# Patient Record
Sex: FEMALE | Race: WHITE | NOT HISPANIC OR LATINO | Employment: OTHER | URBAN - METROPOLITAN AREA
[De-identification: names, ages, dates, MRNs, and addresses within clinical notes are randomized per-mention and may not be internally consistent; named-entity substitution may affect disease eponyms.]

---

## 2017-12-27 ENCOUNTER — APPOINTMENT (OUTPATIENT)
Dept: WOMENS IMAGING | Facility: HOSPITAL | Age: 62
End: 2017-12-27

## 2017-12-27 PROCEDURE — 77067 SCR MAMMO BI INCL CAD: CPT | Performed by: RADIOLOGY

## 2019-06-26 ENCOUNTER — APPOINTMENT (OUTPATIENT)
Dept: WOMENS IMAGING | Facility: HOSPITAL | Age: 64
End: 2019-06-26

## 2019-06-26 PROCEDURE — 77067 SCR MAMMO BI INCL CAD: CPT | Performed by: RADIOLOGY

## 2019-06-26 PROCEDURE — 77063 BREAST TOMOSYNTHESIS BI: CPT | Performed by: RADIOLOGY

## 2021-04-26 ENCOUNTER — OFFICE VISIT CONVERTED (OUTPATIENT)
Dept: SURGERY | Facility: CLINIC | Age: 66
End: 2021-04-26
Attending: NURSE PRACTITIONER

## 2021-05-11 NOTE — H&P
History and Physical      Patient Name: Bailey Reese   Patient ID: [id number]   Sex: Female   YOB: 1955    Primary Care Provider: Truong Garcia MD   Referring Provider: Danielle Garcia MD    Visit Date: April 26, 2021    Provider: DAIANA Guerrero   Location: [facility]   Location Address: [address] [place] [address]  [id number]   Location Phone: [phone]          Chief Complaint  · Requesting colonoscopy  · Age 50 or over  · Blood in Stool     positive cologuard       History Of Present Illness  The patient is a 65 year old /White female presenting to the Surgical Specialist office on a referral from Danielle Garcia MD.   Bailey Reese needs to have a diagnostic colonoscopy.   Patient states that they have not had a colonoscopy.   Patient currently complains of: abnormal stool study   Patient Does not have family history of colon cancer.      Patient presents on referral from Dr. Danielle Garcia for a positive Cologuard.  Patient admits to having some blood in her stool occasionally.  She denies any lower abdominal pain change in bowel habit or rectal bleeding.  She denies any family history of colorectal cancer.  No previous colonoscopy.       Past Medical History  Disease Name Date Onset Notes   Allergic rhinitis, chronic --  --    Arthritis --  --    GERD --  --    High blood pressure --  --    Limb Pain --  --    Limb Swelling --  --          Past Surgical History  Procedure Name Date Notes   Gallbladder --  --    Knee Replacement --  --    Lap Band Surgery --  --          Medication List  Name Date Started Instructions   Claritin 10 mg oral tablet  take 1 tablet (10 mg) by oral route once daily   hydrocodone-acetaminophen 7.5-325 mg oral tablet  take 1 tablet by oral route every 4 hours as needed for pain   Hyzaar 100-25 mg oral tablet  take 1 tablet by oral route once daily   promethazine 12.5 mg oral tablet 08/07/2017 1 po [id number] [id number]  "        Allergy List  Allergen Name Date Reaction Notes   NO KNOWN DRUG ALLERGIES --  --  --    Tape Adherent --  --  --        Allergies Reconciled  Family Medical History  Disease Name Relative/Age Notes   Diabetes, unspecified type Mother/   Mother  Mother; Grandmother (maternal)   -Father's Family History Unknown Father/   Father         Social History  Finding Status Start/Stop Quantity Notes   Alcohol Never --/-- --  drinks no   Caffeine Current - status unknown --/-- --  drinks tea and soft drinks; 1-2 times per day   Second hand smoke exposure Never --/-- --  no   Tobacco Never --/-- --  never a smoker         Review of Systems  · Constitutional  o Denies  o : fever, chills  · Eyes  o Denies  o : yellowish discoloration of eyes  · HENT  o Denies  o : difficulty swallowing  · Cardiovascular  o Denies  o : chest pain, chest pain on exertion  · Respiratory  o Denies  o : shortness of breath  · Gastrointestinal  o Denies  o : nausea, vomiting, diarrhea, constipation  · Genitourinary  o Denies  o : abnormal color of urine  · Integument  o Denies  o : rash  · Neurologic  o Denies  o : tingling or numbness  · Musculoskeletal  o Denies  o : joint pain  · Endocrine  o Denies  o : weight gain, weight loss      Vitals  Date Time BP Position Site L\R Cuff Size HR RR TEMP (F) WT  HT  BMI kg/m2 BSA m2 O2 Sat FR L/min FiO2 HC       04/26/2021 01:32 PM       14  305lbs 8oz 5'  3\" 54.12 2.48             Physical Examination  · Constitutional  o Appearance  o : well developed, well-nourished, patient in no apparent distress  · Head and Face  o Head  o :   § Inspection  § : atraumatic, normocephalic  o Face  o :   § Inspection  § : no facial lesions  · Eyes  o Conjunctivae  o : conjunctivae normal  o Sclerae  o : sclerae white  · Neck  o Inspection/Palpation  o : normal appearance, no masses or tenderness, trachea midline  · Respiratory  o Respiratory Effort  o : breathing unlabored  · Skin and Subcutaneous Tissue  o General " Inspection  o : no lesions present, no areas of discoloration, skin turgor normal, texture normal  · Neurologic  o Mental Status Examination  o :   § Orientation  § : grossly oriented to person, place and time  § Attention  § : attention normal, concentration abilities normal  § Fund of Knowledge  § : fund of knowledge within normal limits, patient aware of current events  o Gait and Station  o : normal gait, able to stand without difficulty  · Psychiatric  o Judgement and Insight  o : judgment and insight intact  o Mood and Affect  o : mood normal, affect appropriate              Assessment  · Abnormal stool test     792.1/R19.5    Problems Reconciled  Plan  · Orders  o Consent for Colonoscopy with Possible Biopsy - Possible risks/complications, benefits, and alternatives to surgical or invasive procedure have been explained to patient and/or legal guardian. -Patient has been evaluated and can tolerate anesthesia and/or sedation. Risks, benefits, and alternatives to anesthesia and sedation have been explained to patient and/or legal guardian. (51985) - 792.1/R19.5 - 05/13/2021  · Medications  o Medications have been Reconciled  o Transition of Care or Provider Policy  · Instructions  o Surgical Facility: [facility]  o Handouts Provided Pre-Procedure Instructions including date, time, and location of procedure.   o PLAN: Proceeed with colonoscopy. Patient understands risks/benefits and is willing to proceed.   o ***Surgical Orders***  o RISK AND BENEFITS:  o Given these options, the patient has verbally expressed an understanding of the risks of the surgery and finds these risks acceptable. Will proceed with surgery as soon as possible.  o O.R. PREP: Per protocol   o IV: Per Anesthesia  o Please sign permit for: Colonoscopy with possible biopsies by Dr. Smith.   o The above History and Physical Examination has been completed within 30 days of admission.  o ***Patient  Status***  o Outpatient  o Follow up in the in the office post procedure.  o Electronically Identified Patient Education Materials Provided Electronically  · Disposition  o EMR dragon/transcription disclaimer: Much of this encounter note is an electronic transcription/translation of spoken language to printed text. Electronic translation of spoken language may permit erroneous, or at times nonsensical words or phrases to be inadvertently trasncribed; although I have reviewed the note for such errors, some may still exist.            Electronically Signed by: DAIANA Guerrero -Author on April 26, 2021 01:56:24 PM

## 2021-05-13 ENCOUNTER — HOSPITAL ENCOUNTER (OUTPATIENT)
Dept: GASTROENTEROLOGY | Facility: HOSPITAL | Age: 66
Setting detail: HOSPITAL OUTPATIENT SURGERY
Discharge: HOME | End: 2021-05-13
Attending: SURGERY

## 2021-05-14 VITALS — HEIGHT: 63 IN | WEIGHT: 293 LBS | BODY MASS INDEX: 51.91 KG/M2 | RESPIRATION RATE: 14 BRPM

## 2021-05-26 ENCOUNTER — TRANSCRIBE ORDERS (OUTPATIENT)
Dept: ADMINISTRATIVE | Facility: HOSPITAL | Age: 66
End: 2021-05-26

## 2021-05-26 ENCOUNTER — OFFICE VISIT CONVERTED (OUTPATIENT)
Dept: SURGERY | Facility: CLINIC | Age: 66
End: 2021-05-26
Attending: SURGERY

## 2021-05-26 DIAGNOSIS — K63.89 COLONIC MASS: Primary | ICD-10-CM

## 2021-06-05 NOTE — H&P
History and Physical      Patient Name: Bailey Reese   Patient ID: [id number]   Sex: Female   YOB: 1955    Primary Care Provider: Truong Garcia MD    Visit Date: May 26, 2021    Provider: Nura Smith MD   Location: [facility]   Location Address: [address] [place] [address]  [id number]   Location Phone: [phone]          Chief Complaint  · Outpatient History & Physical / Surgical Orders  · Benign cecal mass      History Of Present Illness  Bailey Reese is a 65 year old /White female who follows-up status post colonoscopy, which was for screening. On colonoscopy, she was found to have internal hemorrhoids, a polyp in the rectum, some erythema in the rectosigmoid junction, a mass in her cecum, and diverticulosis of the sigmoid colon. On pathology, she was found to have a cecal mass, which came back as normal mucosa with underlying lymphoid aggregate. This was also sent to  for reassessment and they agree with the findings of a normal colonic mucosa. She had a rectosigmoid lesion, which just showed some acute inflammation with no signs of malignancy. She had a rectal polyp, which came back as a submucosal leiomyoma.       Past Medical History  Allergic rhinitis, chronic; Arthritis; GERD; High blood pressure; Limb Pain; Limb Swelling         Past Surgical History  Colonoscopy; Gallbladder; Knee Replacement; Lap Band Surgery         Medication List  Hyzaar 100-25 mg oral tablet; ibuprofen 400 mg oral tablet; multivitamin oral tablet; Osteo Bi-Flex 250-200 mg oral tablet; Prozac 10 mg oral capsule; Suprep Bowel Prep Kit 17.5-3.13-1.6 gram oral recon soln; Tylenol 325 mg oral tablet         Allergy List  NO KNOWN DRUG ALLERGIES; Tape Adherent       Allergies Reconciled  Family Medical History  Diabetes, unspecified type; -Father's Family History Unknown         Social History  Alcohol (Never); Caffeine (Current - status unknown); Second hand smoke  "exposure (Never); Tobacco (Never)         Review of Systems  · Gastrointestinal  o Denies  o : nausea, vomiting, diarrhea, constipation      Vitals  Date Time BP Position Site L\R Cuff Size HR RR TEMP (F) WT  HT  BMI kg/m2 BSA m2 O2 Sat FR L/min FiO2 HC       05/26/2021 10:51 AM         306lbs 2oz 5'  3\" 54.23 2.48             Physical Examination  · Constitutional  o Appearance  o : well developed, well-nourished, alert and in no acute distress  · Head and Face  o Head  o :   § Inspection  § : no deformities or lesions  · Eyes  o Conjunctivae  o : clear  o Sclerae  o : clear  · Neck  o Inspection/Palpation  o : normal appearance, no masses or tenderness, trachea midline  · Respiratory  o Respiratory Effort  o : breathing unlabored  o Inspection of Chest  o : normal appearance, no retractions  · Cardiovascular  o Heart  o : regular rate and rhythm  · Lymphatic  o Neck  o : no lymphadenopathy present  o Axilla  o : no lymphadenopathy present  o Groin  o : no lymphadenopathy present  · Skin and Subcutaneous Tissue  o General Inspection  o : no rashes present, no lesions present, no areas of discoloration  · Neurologic  o Cranial Nerves  o : grossly intact  o Sensation  o : grossly intact  o Gait and Station  o :   § Gait Screening  § : normal gait, able to stand without diffculty  o Cerebellar Function  o : no obvious abnormalities  · Psychiatric  o Judgement and Insight  o : judgment and insight intact  o Mood and Affect  o : mood normal, affect appropriate          Assessment  · Pre-Surgical Orders     V72.84  · Colonic Polyps, Personal History of     V12.72  · Colonic mass     569.89/K63.89  Benign cecal mass      Plan  · Orders  o Colonoscopy (05880) - 569.89/K63.89, V72.84, V12.72 - 08/20/2021  o CT Abdomen and Pelvis with IV Contrast [facility]; suggest Oral Prep (83613) - 569.89/K63.89 - 06/09/2021   [facility] on 6/09/21 at 1130AM  o CT Abdomen and Pelvis without IV Contrast [facility]; suggest oral prep " (allergic--drink Readicat; not allegic but with renal failure--drink Omnipaque) (28756) - 569.89/K63.89 - 06/09/2021  · Medications  o Medications have been Reconciled  o Transition of Care or Provider Policy  · Instructions  o PLAN:   o Handouts Provided-Pre-Procedure Instructions including date and time and location of procedure.  o Surgical Facility: [facility]  o ****Patient Status****  o Outpatient  o ********************  o RISK AND BENEFITS:  o Consent for surgery: Given these options, the patient has verbally expressed an understanding of the risks of surgery and finds these risks acceptable. We will proceed with surgery as soon as possible.  o Consult Anesthesia for any post-operative block, or any pain management procedure deemed necessary by the anestesiologist for adequate post-operative pain control.   o O.R. PREP: Per protocol  o IV: Per Anesthesia  o PLEASE SIGN PERMIT FOR: Colonoscopy with possible biopsies   o *___The above History and Physical Examination has been completed within 30 days of admission.  o Electronically Identified Patient Education Materials Provided Electronically     In regards to the mass, on appearance, the mass did not appear to be malignant but it was a reasonably sizeable mass in the cecum. I thought there was the potential that it could be a lipoma but did take several biopsies of this area.     Seeing as two pathologist agreed this was normal colonic mucosa, I have less concern that it is a malignant mass but still want to verify that we are not missing something.     I have discussed with the patient I would like to go ahead and get some imaging of this area. We will send her for a CT scan of the abdomen and pelvis with PO and IV contrast for reassessment. I would also like to go ahead and repeat the colonoscopy in a few months, just to re-evaluate the area. We will likely take more biopsies just to verify this isn't something more insidious.     Otherwise, I  discussed with the patient her other findings. The leiomyoma is essentially a benign finding. Her hemorrhoids and diverticulitis were discussed. I gave her some printed material on diverticulosis. We discussed things to watch out for with diverticulitis. We discussed diet and lifestyle changes for both hemorrhoids and diverticulosis.     I will follow-up with the patient after her imaging and we will plan for a colonoscopy in the short term. I discussed all of this with the patient. All questions were answered.  She voiced understanding and agreed to proceed with the above plan.             Electronically Signed by: Kacy Borjas-, -Author on June 1, 2021 03:17:22 PM  Electronically Co-signed by: Nura Smith MD -Reviewer on June 1, 2021 09:49:31 PM

## 2021-06-09 ENCOUNTER — APPOINTMENT (OUTPATIENT)
Dept: CT IMAGING | Facility: HOSPITAL | Age: 66
End: 2021-06-09

## 2021-06-14 PROBLEM — J30.9 CHRONIC ALLERGIC RHINITIS: Status: ACTIVE | Noted: 2021-06-14

## 2021-06-14 PROBLEM — I10 HIGH BLOOD PRESSURE: Status: ACTIVE | Noted: 2021-06-14

## 2021-06-14 PROBLEM — M79.89 LIMB SWELLING: Status: ACTIVE | Noted: 2021-06-14

## 2021-06-14 PROBLEM — M79.609 LIMB PAIN: Status: ACTIVE | Noted: 2021-06-14

## 2021-06-14 PROBLEM — K21.9 GERD (GASTROESOPHAGEAL REFLUX DISEASE): Status: ACTIVE | Noted: 2021-06-14

## 2021-06-14 PROBLEM — M19.90 ARTHRITIS: Status: ACTIVE | Noted: 2021-06-14

## 2021-06-15 RX ORDER — LORATADINE 10 MG/1
TABLET ORAL
COMMUNITY
End: 2022-11-28

## 2021-06-15 RX ORDER — HYDROCODONE BITARTRATE AND ACETAMINOPHEN 7.5; 325 MG/1; MG/1
TABLET ORAL
COMMUNITY
End: 2021-09-02

## 2021-06-15 RX ORDER — LOSARTAN POTASSIUM AND HYDROCHLOROTHIAZIDE 25; 100 MG/1; MG/1
TABLET ORAL
COMMUNITY

## 2021-06-15 RX ORDER — FLUOXETINE 10 MG/1
10 CAPSULE ORAL DAILY
COMMUNITY
Start: 2021-03-10 | End: 2021-09-02

## 2021-06-15 RX ORDER — IBUPROFEN 400 MG/1
TABLET ORAL
COMMUNITY
End: 2022-11-28

## 2021-06-15 RX ORDER — LOSARTAN POTASSIUM AND HYDROCHLOROTHIAZIDE 25; 100 MG/1; MG/1
1 TABLET ORAL DAILY
COMMUNITY
Start: 2021-03-10 | End: 2021-09-02

## 2021-06-15 RX ORDER — FLUOXETINE 10 MG/1
CAPSULE ORAL
COMMUNITY

## 2021-06-15 RX ORDER — ACETAMINOPHEN 325 MG/1
TABLET ORAL
COMMUNITY

## 2021-06-15 RX ORDER — MULTIPLE VITAMINS W/ MINERALS TAB 9MG-400MCG
TAB ORAL
COMMUNITY

## 2021-06-17 ENCOUNTER — HOSPITAL ENCOUNTER (OUTPATIENT)
Dept: CT IMAGING | Facility: HOSPITAL | Age: 66
Discharge: HOME | End: 2021-06-17
Admitting: SURGERY

## 2021-06-17 DIAGNOSIS — K63.89 COLONIC MASS: ICD-10-CM

## 2021-06-17 LAB — CREAT BLDA-MCNC: 0.4 MG/DL

## 2021-06-17 PROCEDURE — 74178 CT ABD&PLV WO CNTR FLWD CNTR: CPT

## 2021-06-17 PROCEDURE — 82565 ASSAY OF CREATININE: CPT

## 2021-06-17 PROCEDURE — 0 IOPAMIDOL PER 1 ML: Performed by: SURGERY

## 2021-06-17 RX ADMIN — IOPAMIDOL 100 ML: 755 INJECTION, SOLUTION INTRAVENOUS at 15:10

## 2021-06-18 RX ORDER — CETIRIZINE HYDROCHLORIDE 10 MG/1
10 TABLET ORAL AS NEEDED
COMMUNITY
Start: 2021-05-30

## 2021-06-18 RX ORDER — CITALOPRAM 20 MG/1
20 TABLET ORAL DAILY
COMMUNITY
End: 2022-11-28

## 2021-06-18 RX ORDER — SODIUM, POTASSIUM,MAG SULFATES 17.5-3.13G
SOLUTION, RECONSTITUTED, ORAL ORAL
COMMUNITY
Start: 2021-05-26 | End: 2021-09-02

## 2021-06-21 ENCOUNTER — APPOINTMENT (OUTPATIENT)
Dept: WOMENS IMAGING | Facility: HOSPITAL | Age: 66
End: 2021-06-21

## 2021-06-21 PROCEDURE — 77067 SCR MAMMO BI INCL CAD: CPT | Performed by: RADIOLOGY

## 2021-06-21 PROCEDURE — 77063 BREAST TOMOSYNTHESIS BI: CPT | Performed by: RADIOLOGY

## 2021-06-22 ENCOUNTER — OFFICE VISIT (OUTPATIENT)
Dept: SURGERY | Facility: CLINIC | Age: 66
End: 2021-06-22

## 2021-06-22 ENCOUNTER — PREP FOR SURGERY (OUTPATIENT)
Dept: OTHER | Facility: HOSPITAL | Age: 66
End: 2021-06-22

## 2021-06-22 VITALS — HEIGHT: 63 IN | RESPIRATION RATE: 14 BRPM | BODY MASS INDEX: 51.91 KG/M2 | WEIGHT: 293 LBS

## 2021-06-22 DIAGNOSIS — K63.89 COLONIC MASS: Primary | ICD-10-CM

## 2021-06-22 DIAGNOSIS — K44.9 HIATAL HERNIA: Primary | ICD-10-CM

## 2021-06-22 PROCEDURE — 99213 OFFICE O/P EST LOW 20 MIN: CPT | Performed by: SURGERY

## 2021-06-23 NOTE — PROGRESS NOTES
Chief Complaint: Follow-up (CT)    Subjective         History of Present Illness  Bailey Reese is a 65 y.o. female presents to [facility] to be seen for follow-up after CT scan for colon mass.    Objective     Past Medical History:   Diagnosis Date   • Allergic rhinitis    • Arthritis    • GERD (gastroesophageal reflux disease)    • High blood pressure    • Limb pain    • Limb swelling        Past Surgical History:   Procedure Laterality Date   • COLONOSCOPY     • GALLBLADDER SURGERY     • LAPAROSCOPIC GASTRIC BANDING     • REPLACEMENT TOTAL KNEE           Current Outpatient Medications:   •  acetaminophen (Tylenol) 325 MG tablet, Tylenol 325 mg oral tablet take 1 tablet (325 mg) by oral route every 4 hours as needed   Active, Disp: , Rfl:   •  cetirizine (zyrTEC) 10 MG tablet, Take 10 mg by mouth Daily., Disp: , Rfl:   •  citalopram (CeleXA) 20 MG tablet, Take 20 mg by mouth Daily., Disp: , Rfl:   •  FLUoxetine (PROzac) 10 MG capsule, Prozac 10 mg oral capsule take 1 capsule (10 mg) by oral route once daily   Active, Disp: , Rfl:   •  FLUoxetine (PROzac) 10 MG capsule, Take 10 mg by mouth Daily., Disp: , Rfl:   •  Glucosamine-Chondroitin 250-200 MG tablet, Osteo Bi-Flex 250-200 mg oral tablet take 1 tablet by oral route daily   Active, Disp: , Rfl:   •  HYDROcodone-acetaminophen (Norco) 7.5-325 MG per tablet, Norco Oral Tablet 7.5-325 mg take 1-2 tablets by oral route every 4 to 6 hours   Suspended, Disp: , Rfl:   •  ibuprofen (ADVIL,MOTRIN) 400 MG tablet, , Disp: , Rfl:   •  loratadine (Claritin) 10 MG tablet, Claritin 10 mg oral tablet take 1 tablet (10 mg) by oral route once daily   Suspended, Disp: , Rfl:   •  losartan-hydrochlorothiazide (Hyzaar) 100-25 MG per tablet, Hyzaar 100-25 mg oral tablet take 1 tablet by oral route once daily   Active, Disp: , Rfl:   •  losartan-hydrochlorothiazide (HYZAAR) 100-25 MG per tablet, Take 1 tablet by mouth Daily., Disp: , Rfl:   •   multivitamin with minerals (MULTIVITAMIN ADULT PO), multivitamin oral tablet take 1 tablet by oral route daily   Active, Disp: , Rfl:   •  Suprep Bowel Prep Kit 17.5-3.13-1.6 GM/177ML solution oral solution, take as directed for 1 day, Disp: , Rfl:     Allergies   Allergen Reactions   • Adhesive Tape Palpitations        Family History   Problem Relation Age of Onset   • Diabetes Mother    • Diabetes Maternal Grandmother        Social History     Socioeconomic History   • Marital status:      Spouse name: Not on file   • Number of children: Not on file   • Years of education: Not on file   • Highest education level: Not on file   Tobacco Use   • Smoking status: Never Smoker        Physical Exam   Post Surgical Incision  Surgical wound: None    Result Review :               Assessment and Plan    Diagnoses and all orders for this visit:    1. Colonic mass (Primary)        Follow Up   No follow-ups on file.  Patient was given instructions and counseling regarding her condition or for health maintenance advice. Please see specific information pulled into the AVS if appropriate.       Patient follows up after CT scan after findings of colonic mass on colonoscopy.  Her her mass on colonoscopy came back benign due to the findings did feel it was important to get some imaging to assess the area.  Her CT scan was favorable showing no lymphadenopathy and a benign appearing mass consistent with her pathologic diagnosis.  However on her CT scan she was also found to have a sliding hiatal hernia.  She has not had an EGD, she has minimal symptoms from the hernia but she had symptoms in the past.  Seeing as we are planning for repeat colonoscopy in 3 months we will also assess her hiatal hernia with an EGD at the same time.  Risk-benefit alternatives of the procedure discussed extensively all questions were answered patient voiced understanding to proceed above plan

## 2021-07-15 VITALS — HEIGHT: 63 IN | WEIGHT: 293 LBS | BODY MASS INDEX: 51.91 KG/M2

## 2021-08-18 RX ORDER — ASPIRIN 81 MG/1
81 TABLET, CHEWABLE ORAL DAILY
COMMUNITY

## 2021-08-20 ENCOUNTER — ANESTHESIA EVENT (OUTPATIENT)
Dept: GASTROENTEROLOGY | Facility: HOSPITAL | Age: 66
End: 2021-08-20

## 2021-08-20 ENCOUNTER — ANESTHESIA (OUTPATIENT)
Dept: GASTROENTEROLOGY | Facility: HOSPITAL | Age: 66
End: 2021-08-20

## 2021-08-20 ENCOUNTER — HOSPITAL ENCOUNTER (OUTPATIENT)
Facility: HOSPITAL | Age: 66
Setting detail: HOSPITAL OUTPATIENT SURGERY
Discharge: HOME | End: 2021-08-20
Attending: SURGERY | Admitting: SURGERY

## 2021-08-20 VITALS
SYSTOLIC BLOOD PRESSURE: 127 MMHG | DIASTOLIC BLOOD PRESSURE: 71 MMHG | WEIGHT: 293 LBS | OXYGEN SATURATION: 96 % | RESPIRATION RATE: 20 BRPM | TEMPERATURE: 98.2 F | HEART RATE: 69 BPM | HEIGHT: 63 IN | BODY MASS INDEX: 51.91 KG/M2

## 2021-08-20 DIAGNOSIS — Z12.11 COLON CANCER SCREENING: ICD-10-CM

## 2021-08-20 DIAGNOSIS — K21.9 GERD (GASTROESOPHAGEAL REFLUX DISEASE): ICD-10-CM

## 2021-08-20 PROCEDURE — 25010000002 PROPOFOL 10 MG/ML EMULSION: Performed by: NURSE ANESTHETIST, CERTIFIED REGISTERED

## 2021-08-20 PROCEDURE — 88305 TISSUE EXAM BY PATHOLOGIST: CPT | Performed by: SURGERY

## 2021-08-20 RX ORDER — LIDOCAINE HYDROCHLORIDE 20 MG/ML
INJECTION, SOLUTION INFILTRATION; PERINEURAL AS NEEDED
Status: DISCONTINUED | OUTPATIENT
Start: 2021-08-20 | End: 2021-08-20 | Stop reason: SURG

## 2021-08-20 RX ORDER — PHENYLEPHRINE HCL IN 0.9% NACL 1 MG/10 ML
SYRINGE (ML) INTRAVENOUS AS NEEDED
Status: DISCONTINUED | OUTPATIENT
Start: 2021-08-20 | End: 2021-08-20 | Stop reason: SURG

## 2021-08-20 RX ORDER — SODIUM CHLORIDE, SODIUM LACTATE, POTASSIUM CHLORIDE, CALCIUM CHLORIDE 600; 310; 30; 20 MG/100ML; MG/100ML; MG/100ML; MG/100ML
INJECTION, SOLUTION INTRAVENOUS CONTINUOUS PRN
Status: DISCONTINUED | OUTPATIENT
Start: 2021-08-20 | End: 2021-08-20 | Stop reason: SURG

## 2021-08-20 RX ORDER — SODIUM CHLORIDE, SODIUM LACTATE, POTASSIUM CHLORIDE, CALCIUM CHLORIDE 600; 310; 30; 20 MG/100ML; MG/100ML; MG/100ML; MG/100ML
30 INJECTION, SOLUTION INTRAVENOUS CONTINUOUS
Status: DISCONTINUED | OUTPATIENT
Start: 2021-08-20 | End: 2021-08-20 | Stop reason: HOSPADM

## 2021-08-20 RX ADMIN — PROPOFOL 125 MCG/KG/MIN: 10 INJECTION, EMULSION INTRAVENOUS at 07:33

## 2021-08-20 RX ADMIN — LIDOCAINE HYDROCHLORIDE 30 MG: 20 INJECTION, SOLUTION INFILTRATION; PERINEURAL at 07:33

## 2021-08-20 RX ADMIN — SODIUM CHLORIDE, POTASSIUM CHLORIDE, SODIUM LACTATE AND CALCIUM CHLORIDE: 600; 310; 30; 20 INJECTION, SOLUTION INTRAVENOUS at 07:29

## 2021-08-20 RX ADMIN — Medication 50 MCG: at 07:57

## 2021-08-20 RX ADMIN — Medication 100 MCG: at 07:47

## 2021-08-20 NOTE — ANESTHESIA POSTPROCEDURE EVALUATION
Patient: Bailey Reese    Procedure Summary     Date: 08/20/21 Room / Location: [facility] ENDOSCOPY 1 / [facility] ENDOSCOPY    Anesthesia Start: 0729 Anesthesia Stop: 0823    Procedures:       COLONOSCOPY WITH BIOPSIES (N/A )      ESOPHAGOGASTRODUODENOSCOPY WITH BIOPSIES (N/A ) Diagnosis: (HISTORY OF POLYPS, BENIGN CACAL MASS)    Surgeons: Nura Smith MD Provider: Maverick Castro MD    Anesthesia Type: general, MAC ASA Status: 3          Anesthesia Type: general, MAC    Vitals  Vitals Value Taken Time   /65 08/20/21 0825   Temp 36.9 °C (98.4 °F) 08/20/21 0820   Pulse 71 08/20/21 0825   Resp 16 08/20/21 0825   SpO2 97 % 08/20/21 0825           Post Anesthesia Care and Evaluation    Patient location during evaluation: bedside  Patient participation: complete - patient participated  Level of consciousness: awake  Pain management: adequate  Airway patency: patent  Anesthetic complications: No anesthetic complications  PONV Status: none  Cardiovascular status: acceptable and stable  Respiratory status: acceptable and room air  Hydration status: acceptable    Comments: An Anesthesiologist personally participated in the most demanding procedures (including induction and emergence if applicable) in the anesthesia plan, monitored the course of anesthesia administration at frequent intervals and remained physically present and available for immediate diagnosis and treatment of emergencies.

## 2021-08-20 NOTE — ANESTHESIA PREPROCEDURE EVALUATION
Anesthesia Evaluation     Patient summary reviewed and Nursing notes reviewed   NPO Solid Status: > 6 hours  NPO Liquid Status: > 6 hours           Airway   Mallampati: II  TM distance: >3 FB  Dental      Pulmonary - normal exam   Cardiovascular - normal exam    (+) hypertension,       Neuro/Psych  GI/Hepatic/Renal/Endo    (+) obesity, morbid obesity, GERD,      Musculoskeletal     Abdominal    Substance History      OB/GYN          Other                        Anesthesia Plan    ASA 3     general and MAC   total IV anesthesia  intravenous induction     Anesthetic plan, all risks, benefits, and alternatives have been provided, discussed and informed consent has been obtained with: patient.

## 2021-08-20 NOTE — H&P
History of Present Illness  Bailey Reese is a 65 y.o. female presents to [facility] to be seen for follow-up after CT scan for colon mass.           Objective         Medical History        Past Medical History:   Diagnosis Date   • Allergic rhinitis     • Arthritis     • GERD (gastroesophageal reflux disease)     • High blood pressure     • Limb pain     • Limb swelling              Surgical History         Past Surgical History:   Procedure Laterality Date   • COLONOSCOPY       • GALLBLADDER SURGERY       • LAPAROSCOPIC GASTRIC BANDING       • REPLACEMENT TOTAL KNEE                   Current Outpatient Medications:   •  acetaminophen (Tylenol) 325 MG tablet, Tylenol 325 mg oral tablet take 1 tablet (325 mg) by oral route every 4 hours as needed   Active, Disp: , Rfl:   •  cetirizine (zyrTEC) 10 MG tablet, Take 10 mg by mouth Daily., Disp: , Rfl:   •  citalopram (CeleXA) 20 MG tablet, Take 20 mg by mouth Daily., Disp: , Rfl:   •  FLUoxetine (PROzac) 10 MG capsule, Prozac 10 mg oral capsule take 1 capsule (10 mg) by oral route once daily   Active, Disp: , Rfl:   •  FLUoxetine (PROzac) 10 MG capsule, Take 10 mg by mouth Daily., Disp: , Rfl:   •  Glucosamine-Chondroitin 250-200 MG tablet, Osteo Bi-Flex 250-200 mg oral tablet take 1 tablet by oral route daily   Active, Disp: , Rfl:   •  HYDROcodone-acetaminophen (Norco) 7.5-325 MG per tablet, Norco Oral Tablet 7.5-325 mg take 1-2 tablets by oral route every 4 to 6 hours   Suspended, Disp: , Rfl:   •  ibuprofen (ADVIL,MOTRIN) 400 MG tablet, , Disp: , Rfl:   •  loratadine (Claritin) 10 MG tablet, Claritin 10 mg oral tablet take 1 tablet (10 mg) by oral route once daily   Suspended, Disp: , Rfl:   •  losartan-hydrochlorothiazide (Hyzaar) 100-25 MG per tablet, Hyzaar 100-25 mg oral tablet take 1 tablet by oral route once daily   Active, Disp: , Rfl:   •  losartan-hydrochlorothiazide (HYZAAR) 100-25 MG per tablet, Take 1 tablet by mouth  Daily., Disp: , Rfl:   •  multivitamin with minerals (MULTIVITAMIN ADULT PO), multivitamin oral tablet take 1 tablet by oral route daily   Active, Disp: , Rfl:   •  Suprep Bowel Prep Kit 17.5-3.13-1.6 GM/177ML solution oral solution, take as directed for 1 day, Disp: , Rfl:           Allergies   Allergen Reactions   • Adhesive Tape Palpitations               Family History   Problem Relation Age of Onset   • Diabetes Mother     • Diabetes Maternal Grandmother           Social History   Social History            Socioeconomic History   • Marital status:        Spouse name: Not on file   • Number of children: Not on file   • Years of education: Not on file   • Highest education level: Not on file   Tobacco Use   • Smoking status: Never Smoker            Physical Exam   Post Surgical Incision  Surgical wound: None           Result Review    :                     Assessment      Assessment and Plan    Diagnoses and all orders for this visit:     1. Colonic mass (Primary)           Follow Up   No follow-ups on file.  Patient was given instructions and counseling regarding her condition or for health maintenance advice. Please see specific information pulled into the AVS if appropriate.         Patient follows up after CT scan after findings of colonic mass on colonoscopy.  Her her mass on colonoscopy came back benign due to the findings did feel it was important to get some imaging to assess the area.  Her CT scan was favorable showing no lymphadenopathy and a benign appearing mass consistent with her pathologic diagnosis.  However on her CT scan she was also found to have a sliding hiatal hernia.  She has not had an EGD, she has minimal symptoms from the hernia but she had symptoms in the past.  Seeing as we are planning for repeat colonoscopy in 3 months we will also assess her hiatal hernia with an EGD at the same time.  Risk-benefit alternatives of the procedure discussed extensively all questions were  answered patient voiced understanding to proceed above plan

## 2021-08-24 LAB
CYTO UR: NORMAL
LAB AP CASE REPORT: NORMAL
LAB AP CLINICAL INFORMATION: NORMAL
LAB AP DIAGNOSIS COMMENT: NORMAL
PATH REPORT.FINAL DX SPEC: NORMAL
PATH REPORT.GROSS SPEC: NORMAL

## 2021-09-02 ENCOUNTER — PREP FOR SURGERY (OUTPATIENT)
Dept: OTHER | Facility: HOSPITAL | Age: 66
End: 2021-09-02

## 2021-09-02 ENCOUNTER — OFFICE VISIT (OUTPATIENT)
Dept: SURGERY | Facility: CLINIC | Age: 66
End: 2021-09-02

## 2021-09-02 VITALS — BODY MASS INDEX: 53.92 KG/M2 | HEIGHT: 62 IN | WEIGHT: 293 LBS | RESPIRATION RATE: 14 BRPM

## 2021-09-02 DIAGNOSIS — R22.32 AXILLARY MASS, LEFT: Primary | ICD-10-CM

## 2021-09-02 DIAGNOSIS — K57.90 DIVERTICULOSIS: ICD-10-CM

## 2021-09-02 DIAGNOSIS — K63.89 COLONIC MASS: Primary | ICD-10-CM

## 2021-09-02 PROCEDURE — 99213 OFFICE O/P EST LOW 20 MIN: CPT | Performed by: SURGERY

## 2021-09-02 RX ORDER — SODIUM CHLORIDE 0.9 % (FLUSH) 0.9 %
10 SYRINGE (ML) INJECTION EVERY 12 HOURS SCHEDULED
Status: CANCELLED | OUTPATIENT
Start: 2021-09-02

## 2021-09-02 RX ORDER — CEFAZOLIN SODIUM 2 G/100ML
2 INJECTION, SOLUTION INTRAVENOUS ONCE
Status: CANCELLED | OUTPATIENT
Start: 2021-09-02 | End: 2021-09-02

## 2021-09-02 RX ORDER — SODIUM CHLORIDE 0.9 % (FLUSH) 0.9 %
10 SYRINGE (ML) INJECTION AS NEEDED
Status: CANCELLED | OUTPATIENT
Start: 2021-09-02

## 2021-09-02 NOTE — PROGRESS NOTES
Chief Complaint: Follow-up (Double)    Subjective         History of Present Illness  Bailey Reese is a 66 y.o. female presents to [facility] GENERAL SURGERY to be seen for follow-up after colonoscopy and EGD.  Patient is done well for her procedures.  Not reporting expected signs or symptoms..    Objective     Past Medical History:   Diagnosis Date   • Allergic rhinitis    • Arthritis    • Concussion    • High blood pressure    • Limb pain    • Limb swelling        Past Surgical History:   Procedure Laterality Date   • COLONOSCOPY     • COLONOSCOPY N/A 8/20/2021    Procedure: COLONOSCOPY WITH BIOPSIES;  Surgeon: Nura Smith MD;  Location: [facility] ENDOSCOPY;  Service: General;  Laterality: N/A;  CECUM MASS, DIVERTICULOSIS, HEMORRHOIDS, COLITIS   • ENDOSCOPY N/A 8/20/2021    Procedure: ESOPHAGOGASTRODUODENOSCOPY WITH BIOPSIES;  Surgeon: Nura Simth MD;  Location: [facility] ENDOSCOPY;  Service: General;  Laterality: N/A;  DUODENITIS, HIATAL HERNIA   • FEMUR FRACTURE SURGERY     • GALLBLADDER SURGERY     • GASTRIC BANDING REMOVAL     • LAPAROSCOPIC GASTRIC BANDING     • REPLACEMENT TOTAL KNEE           Current Outpatient Medications:   •  acetaminophen (Tylenol) 325 MG tablet, Tylenol 325 mg oral tablet take 1 tablet (325 mg) by oral route every 4 hours as needed   Active, Disp: , Rfl:   •  aspirin 81 MG chewable tablet, Chew 81 mg Daily., Disp: , Rfl:   •  cetirizine (zyrTEC) 10 MG tablet, Take 10 mg by mouth Daily., Disp: , Rfl:   •  citalopram (CeleXA) 20 MG tablet, Take 20 mg by mouth Daily., Disp: , Rfl:   •  FLUoxetine (PROzac) 10 MG capsule, Prozac 10 mg oral capsule take 1 capsule (10 mg) by oral route once daily   Active, Disp: , Rfl:   •  Glucosamine-Chondroitin 250-200 MG tablet, Osteo Bi-Flex 250-200 mg oral tablet take 1 tablet by oral route daily   Active, Disp: , Rfl:   •  ibuprofen (ADVIL,MOTRIN) 400 MG tablet, , Disp: , Rfl:   •  loratadine (Claritin) 10 MG tablet, Claritin  10 mg oral tablet take 1 tablet (10 mg) by oral route once daily   Suspended, Disp: , Rfl:   •  losartan-hydrochlorothiazide (Hyzaar) 100-25 MG per tablet, Hyzaar 100-25 mg oral tablet take 1 tablet by oral route once daily   Active, Disp: , Rfl:   •  multivitamin with minerals (MULTIVITAMIN ADULT PO), multivitamin oral tablet take 1 tablet by oral route daily   Active, Disp: , Rfl:     Allergies   Allergen Reactions   • Adhesive Tape Palpitations        Family History   Problem Relation Age of Onset   • Diabetes Mother    • Diabetes Maternal Grandmother    • Malig Hyperthermia Neg Hx        Social History     Socioeconomic History   • Marital status:      Spouse name: Not on file   • Number of children: Not on file   • Years of education: Not on file   • Highest education level: Not on file   Tobacco Use   • Smoking status: Never Smoker   • Smokeless tobacco: Never Used   Substance and Sexual Activity   • Alcohol use: Not Currently     Comment: WAS OCCASIONALLY   • Drug use: Never        Physical Exam  Constitutional:       Appearance: Normal appearance.   Cardiovascular:      Rate and Rhythm: Normal rate.   Pulmonary:      Effort: Pulmonary effort is normal.   Abdominal:      Palpations: Abdomen is soft.   Skin:     General: Skin is warm.            Result Review :               Assessment and Plan    Diagnoses and all orders for this visit:    1. Colonic mass (Primary)    2. Diverticulosis        Follow Up   No follow-ups on file.  Patient was given instructions and counseling regarding her condition or for health maintenance advice. Please see specific information pulled into the AVS if appropriate.     In regards to the colonoscopy, patient has a colonic mass.  This appears to be a lipoma.  He did have an ulceration which is of concern.  But after multiple biopsies and 2 evaluations, I feel that this is almost certainly a benign lipomatous mass.  Given its finding I would like to repeat the colonoscopy  in 1 year.  We had discussed her previous colonoscopic findings of polyp and diverticulosis at her previous follow-up.    In regards to her upper endoscopy she did have some duodenitis I have recommended some antacid medication.  She currently has no symptoms.  She can follow-up with me with any questions for this.    Discussed with the patient - all questions were answered they voiced understanding and agreed to proceed with above plan

## 2021-09-13 ENCOUNTER — HOSPITAL ENCOUNTER (OUTPATIENT)
Dept: GENERAL RADIOLOGY | Facility: HOSPITAL | Age: 66
Discharge: HOME | End: 2021-09-13
Admitting: SURGERY

## 2021-09-13 DIAGNOSIS — K21.9 GERD (GASTROESOPHAGEAL REFLUX DISEASE): ICD-10-CM

## 2021-09-13 PROCEDURE — 74240 X-RAY XM UPR GI TRC 1CNTRST: CPT

## 2021-09-13 PROCEDURE — 74248 X-RAY SM INT F-THRU STD: CPT

## 2021-09-13 PROCEDURE — 0 DIATRIZOATE MEGLUMINE & SODIUM PER 1 ML: Performed by: SURGERY

## 2021-09-13 RX ADMIN — BARIUM SULFATE 135 ML: 980 POWDER, FOR SUSPENSION ORAL at 11:50

## 2021-09-13 RX ADMIN — ANTACID/ANTIFLATULENT 1 PACKET: 380; 550; 10; 10 GRANULE, EFFERVESCENT ORAL at 11:50

## 2021-09-13 RX ADMIN — BARIUM SULFATE 355 ML: 0.6 SUSPENSION ORAL at 11:50

## 2021-09-13 RX ADMIN — DIATRIZOATE MEGLUMINE AND DIATRIZOATE SODIUM 30 ML: 660; 100 LIQUID ORAL; RECTAL at 11:50

## 2021-09-27 ENCOUNTER — TELEPHONE (OUTPATIENT)
Dept: SURGERY | Facility: CLINIC | Age: 66
End: 2021-09-27

## 2022-07-29 ENCOUNTER — APPOINTMENT (OUTPATIENT)
Dept: WOMENS IMAGING | Facility: HOSPITAL | Age: 67
End: 2022-07-29

## 2022-07-29 PROCEDURE — 77067 SCR MAMMO BI INCL CAD: CPT | Performed by: RADIOLOGY

## 2022-07-29 PROCEDURE — 77063 BREAST TOMOSYNTHESIS BI: CPT | Performed by: RADIOLOGY

## 2022-08-04 ENCOUNTER — TRANSCRIBE ORDERS (OUTPATIENT)
Dept: ADMINISTRATIVE | Facility: HOSPITAL | Age: 67
End: 2022-08-04

## 2022-08-04 DIAGNOSIS — N64.89 BREAST ASYMMETRY IN FEMALE: Primary | ICD-10-CM

## 2022-08-12 ENCOUNTER — HOSPITAL ENCOUNTER (OUTPATIENT)
Dept: ULTRASOUND IMAGING | Facility: HOSPITAL | Age: 67
Discharge: HOME | End: 2022-08-12

## 2022-08-12 ENCOUNTER — HOSPITAL ENCOUNTER (OUTPATIENT)
Dept: MAMMOGRAPHY | Facility: HOSPITAL | Age: 67
Discharge: HOME | End: 2022-08-12

## 2022-08-12 DIAGNOSIS — N64.89 BREAST ASYMMETRY IN FEMALE: ICD-10-CM

## 2022-08-12 PROCEDURE — G0279 TOMOSYNTHESIS, MAMMO: HCPCS

## 2022-08-12 PROCEDURE — 77065 DX MAMMO INCL CAD UNI: CPT

## 2022-08-18 ENCOUNTER — TELEPHONE (OUTPATIENT)
Dept: SURGERY | Facility: CLINIC | Age: 67
End: 2022-08-18

## 2022-08-18 NOTE — TELEPHONE ENCOUNTER
Caller: Bailey Reese    Relationship to patient: Self    Best call back number: [phone]    Patient is needing: PT CALLED TO SCHEDULE 1 YEAR FOLLOW UP FOR COLON CONSULT. PT ANSWERED YES TO QUESTION 2 OF THE TRAVEL HISTORY. SHE WAS EXPOSED TO COVID ON 8/14.    ATTEMPTED TO VERIFY OKAY TO SCHEDULE; NO ANSWER AT PRACTICE    PT STATED SHE DOES NOT HAVE ANY SYMPTOMS. ADVISED PT IF SHE DEVELOPS SYMPTOMS OR TESTS POSITIVE TO CALL AND LET US KNOW.    PLEASE CONTACT PT IF RESCHEDULING IS NEEDED DUE TO REPORTED COVID EXPOSURE ON 8/14.

## 2022-09-08 ENCOUNTER — OFFICE VISIT (OUTPATIENT)
Dept: SURGERY | Facility: CLINIC | Age: 67
End: 2022-09-08

## 2022-09-08 ENCOUNTER — PREP FOR SURGERY (OUTPATIENT)
Dept: OTHER | Facility: HOSPITAL | Age: 67
End: 2022-09-08

## 2022-09-08 VITALS — WEIGHT: 293 LBS | BODY MASS INDEX: 53.92 KG/M2 | HEIGHT: 62 IN | HEART RATE: 100 BPM

## 2022-09-08 DIAGNOSIS — K63.89 MASS OF CECUM: ICD-10-CM

## 2022-09-08 DIAGNOSIS — K62.5 BRBPR (BRIGHT RED BLOOD PER RECTUM): Primary | ICD-10-CM

## 2022-09-08 DIAGNOSIS — R19.4 CHANGE IN BOWEL HABITS: ICD-10-CM

## 2022-09-08 DIAGNOSIS — K63.89 CECUM MASS: ICD-10-CM

## 2022-09-08 PROCEDURE — 99213 OFFICE O/P EST LOW 20 MIN: CPT | Performed by: NURSE PRACTITIONER

## 2022-09-08 RX ORDER — GABAPENTIN 300 MG/1
300 CAPSULE ORAL 3 TIMES DAILY
COMMUNITY
Start: 2022-08-11

## 2022-09-08 RX ORDER — GLIMEPIRIDE 2 MG/1
TABLET ORAL
COMMUNITY
Start: 2022-08-15

## 2022-09-08 RX ORDER — SOD SULF/POT CHLORIDE/MAG SULF 1.479 G
24 TABLET ORAL ONCE
Qty: 24 TABLET | Refills: 0 | Status: SHIPPED | OUTPATIENT
Start: 2022-09-08 | End: 2022-09-08

## 2022-09-08 RX ORDER — FUROSEMIDE 20 MG/1
20 TABLET ORAL DAILY
COMMUNITY
Start: 2022-08-25

## 2022-09-08 NOTE — PROGRESS NOTES
Chief Complaint: Colonoscopy    Subjective      Colonoscopy consultation       History of Present Illness  Bailey Reese is a 67 y.o. female presents to [facility] GENERAL SURGERY for colonoscopy consultation.    Patient presents today with complaints of bright red blood per rectum.  Patient reports she is only seeing this while wiping.  Admits to a change in bowel habit with narrow pencillike stools.  Denies any abdominal pain.    Patient reports that she had a colonoscopy last year with Dr. Smith and reports she has a benign mass in her colon.  After reviewing pathology pathology is consistent with a lipoma of the cecum.    Denies any family history of colorectal cancer.    8/21: EGD & Colonoscopy ([place]): Gastritis; Peptic duodenitisCecum- Colonic mucosa with ulceration and reactive epithelial changes; sigmoid-colonic mucosa with mild vascular congestion; diverticulosis; External & internal hemorrhoids.     5/21: Colonoscopy ([place]): Cecum -specimen sent to [facility] for further evaluation-benign biopsy; rectrosigmoid -colonic mucosa with mild superficial vascular congestion and focal acute inflammation; rectum -submucosal leiomyoma; diverticulosis; external hemorrhoids.       Objective     Past Medical History:   Diagnosis Date   • Allergic rhinitis    • Arthritis    • Concussion    • High blood pressure    • Limb pain    • Limb swelling        Past Surgical History:   Procedure Laterality Date   • COLONOSCOPY     • COLONOSCOPY N/A 8/20/2021    Procedure: COLONOSCOPY WITH BIOPSIES;  Surgeon: Nura Smith MD;  Location: [facility] ENDOSCOPY;  Service: General;  Laterality: N/A;  CECUM MASS, DIVERTICULOSIS, HEMORRHOIDS, COLITIS   • ENDOSCOPY N/A 8/20/2021    Procedure: ESOPHAGOGASTRODUODENOSCOPY WITH BIOPSIES;  Surgeon: Nura Smith MD;  Location: [facility] ENDOSCOPY;  Service: General;  Laterality: N/A;  DUODENITIS, HIATAL HERNIA   • FEMUR FRACTURE SURGERY     •  "GALLBLADDER SURGERY     • GASTRIC BANDING REMOVAL     • LAPAROSCOPIC GASTRIC BANDING     • REPLACEMENT TOTAL KNEE         Outpatient Medications Marked as Taking for the 9/8/22 encounter (Office Visit) with Cadence Bear APRN   Medication Sig Dispense Refill   • aspirin 81 MG chewable tablet Chew 81 mg Daily.     • cetirizine (zyrTEC) 10 MG tablet Take 10 mg by mouth Daily.     • FLUoxetine (PROzac) 10 MG capsule Prozac 10 mg oral capsule take 1 capsule (10 mg) by oral route once daily   Active     • furosemide (LASIX) 20 MG tablet      • gabapentin (NEURONTIN) 300 MG capsule Take 300 mg by mouth 3 (Three) Times a Day.     • glimepiride (AMARYL) 2 MG tablet TAKE 1 TABLET BY MOUTH DAILY with largest meal     • Glucosamine-Chondroitin 250-200 MG tablet Osteo Bi-Flex 250-200 mg oral tablet take 1 tablet by oral route daily   Active     • losartan-hydrochlorothiazide (HYZAAR) 100-25 MG per tablet Hyzaar 100-25 mg oral tablet take 1 tablet by oral route once daily   Active     • multivitamin with minerals tablet tablet multivitamin oral tablet take 1 tablet by oral route daily   Active         Allergies   Allergen Reactions   • Adhesive Tape Palpitations        Family History   Problem Relation Age of Onset   • Diabetes Mother    • Diabetes Maternal Grandmother    • Malig Hyperthermia Neg Hx        Social History     Socioeconomic History   • Marital status:    Tobacco Use   • Smoking status: Never Smoker   • Smokeless tobacco: Never Used   Substance and Sexual Activity   • Alcohol use: Not Currently     Comment: WAS OCCASIONALLY   • Drug use: Never       Review of Systems   Constitutional: Negative for chills and fever.   Gastrointestinal: Positive for anal bleeding. Negative for abdominal distention, abdominal pain, blood in stool, constipation, diarrhea and rectal pain.        Vital Signs:   Pulse 100   Ht 157.5 cm (62\")   Wt (!) 140 kg (309 lb 6.4 oz)   BMI 56.59 kg/m²      Physical Exam  Vitals and nursing " note reviewed.   Constitutional:       General: She is not in acute distress.     Appearance: She is obese.   HENT:      Head: Normocephalic.   Cardiovascular:      Rate and Rhythm: Normal rate.   Pulmonary:      Effort: Pulmonary effort is normal.      Breath sounds: No stridor. No wheezing.   Abdominal:      Palpations: Abdomen is soft.      Tenderness: There is no guarding.   Musculoskeletal:         General: No deformity. Normal range of motion.   Skin:     General: Skin is warm and dry.      Coloration: Skin is not jaundiced.   Neurological:      General: No focal deficit present.      Mental Status: She is alert and oriented to person, place, and time.   Psychiatric:         Mood and Affect: Mood normal.         Thought Content: Thought content normal.          Result Review :          []  Laboratory  []  Radiology  [x]  Pathology  []  Microbiology  []  EKG/Telemetry   []  Cardiology/Vascular   [x]  Old records  Today I have reviewed Dr. Smith's previous colonoscopies and pathology     Assessment and Plan    Diagnoses and all orders for this visit:    1. BRBPR (bright red blood per rectum) (Primary)    2. Change in bowel habits    3. Cecum mass    Other orders  -     Sodium Sulfate-Mag Sulfate-KCl (Sutab) 6069-405-800 MG tablet; Take 24 tablets by mouth 1 (One) Time for 1 dose. Take as directed. Directions given in office  Dispense: 24 tablet; Refill: 0    sutab prep    Follow Up   Return for Schedule colonoscopy with Dr. Smith on 12/1/2022 at [facility].     Hospital arrival time: 11:30.    Possible risks/complications, benefits, and alternatives to surgical or invasive procedure have been explained to patient and/or legal guardian.     Patient has been evaluated and can tolerate anesthesia and/or sedation. Risks, benefits, and alternatives to anesthesia and sedation have been explained to patient and/or legal guardian.  Patient verbalizes understanding and is will proceed with above  plan.    Patient was given instructions and counseling regarding her condition or for health maintenance advice. Please see specific information pulled into the AVS if appropriate.

## 2022-12-01 ENCOUNTER — ANESTHESIA (OUTPATIENT)
Dept: GASTROENTEROLOGY | Facility: HOSPITAL | Age: 67
End: 2022-12-01

## 2022-12-01 ENCOUNTER — ANESTHESIA EVENT (OUTPATIENT)
Dept: GASTROENTEROLOGY | Facility: HOSPITAL | Age: 67
End: 2022-12-01

## 2022-12-01 ENCOUNTER — HOSPITAL ENCOUNTER (OUTPATIENT)
Facility: HOSPITAL | Age: 67
Setting detail: HOSPITAL OUTPATIENT SURGERY
Discharge: HOME | End: 2022-12-01
Attending: SURGERY | Admitting: SURGERY

## 2022-12-01 VITALS
HEART RATE: 74 BPM | TEMPERATURE: 98.2 F | HEIGHT: 63 IN | RESPIRATION RATE: 18 BRPM | OXYGEN SATURATION: 98 % | SYSTOLIC BLOOD PRESSURE: 124 MMHG | BODY MASS INDEX: 51.91 KG/M2 | WEIGHT: 293 LBS | DIASTOLIC BLOOD PRESSURE: 78 MMHG

## 2022-12-01 DIAGNOSIS — K62.5 BRBPR (BRIGHT RED BLOOD PER RECTUM): ICD-10-CM

## 2022-12-01 DIAGNOSIS — R19.4 CHANGE IN BOWEL HABITS: ICD-10-CM

## 2022-12-01 DIAGNOSIS — K63.89 MASS OF CECUM: ICD-10-CM

## 2022-12-01 LAB — GLUCOSE BLDC GLUCOMTR-MCNC: 136 MG/DL (ref 70–99)

## 2022-12-01 PROCEDURE — 88305 TISSUE EXAM BY PATHOLOGIST: CPT | Performed by: SURGERY

## 2022-12-01 PROCEDURE — 82962 GLUCOSE BLOOD TEST: CPT

## 2022-12-01 PROCEDURE — 25010000002 PROPOFOL 10 MG/ML EMULSION: Performed by: NURSE ANESTHETIST, CERTIFIED REGISTERED

## 2022-12-01 RX ORDER — LIDOCAINE HYDROCHLORIDE 20 MG/ML
INJECTION, SOLUTION EPIDURAL; INFILTRATION; INTRACAUDAL; PERINEURAL AS NEEDED
Status: DISCONTINUED | OUTPATIENT
Start: 2022-12-01 | End: 2022-12-01 | Stop reason: SURG

## 2022-12-01 RX ORDER — SODIUM CHLORIDE, SODIUM LACTATE, POTASSIUM CHLORIDE, CALCIUM CHLORIDE 600; 310; 30; 20 MG/100ML; MG/100ML; MG/100ML; MG/100ML
30 INJECTION, SOLUTION INTRAVENOUS CONTINUOUS
Status: DISCONTINUED | OUTPATIENT
Start: 2022-12-01 | End: 2022-12-01 | Stop reason: HOSPADM

## 2022-12-01 RX ORDER — PROPOFOL 10 MG/ML
VIAL (ML) INTRAVENOUS AS NEEDED
Status: DISCONTINUED | OUTPATIENT
Start: 2022-12-01 | End: 2022-12-01 | Stop reason: SURG

## 2022-12-01 RX ADMIN — SODIUM CHLORIDE, POTASSIUM CHLORIDE, SODIUM LACTATE AND CALCIUM CHLORIDE 30 ML/HR: 600; 310; 30; 20 INJECTION, SOLUTION INTRAVENOUS at 12:08

## 2022-12-01 RX ADMIN — PROPOFOL 150 MCG/KG/MIN: 10 INJECTION, EMULSION INTRAVENOUS at 13:32

## 2022-12-01 RX ADMIN — LIDOCAINE HYDROCHLORIDE 100 MG: 20 INJECTION, SOLUTION EPIDURAL; INFILTRATION; INTRACAUDAL; PERINEURAL at 13:32

## 2022-12-01 RX ADMIN — PROPOFOL 50 MG: 10 INJECTION, EMULSION INTRAVENOUS at 13:32

## 2022-12-01 NOTE — ANESTHESIA POSTPROCEDURE EVALUATION
Patient: Bailey Reese    Procedure Summary     Date: 12/01/22 Room / Location: [facility] ENDOSCOPY 1 / [facility] ENDOSCOPY    Anesthesia Start: 1330 Anesthesia Stop: 1403    Procedure: COLONOSCOPY with biopies. Diagnosis:       BRBPR (bright red blood per rectum)      Change in bowel habits      Mass of cecum      (BRBPR (bright red blood per rectum) [K62.5])      (Change in bowel habits [R19.4])      (Mass of cecum [K63.89])    Surgeons: Nura Smith MD Provider: Neil Dove MD    Anesthesia Type: general ASA Status: 3          Anesthesia Type: general    Vitals  Vitals Value Taken Time   BP 78/57 12/01/22 1423   Temp 36.8 °C (98.2 °F) 12/01/22 1422   Pulse 77 12/01/22 1426   Resp 18 12/01/22 1422   SpO2 98 % 12/01/22 1426   Vitals shown include unvalidated device data.        Post Anesthesia Care and Evaluation    Patient location during evaluation: bedside  Patient participation: complete - patient participated  Level of consciousness: awake  Pain management: adequate    Airway patency: patent  Anesthetic complications: No anesthetic complications  PONV Status: none  Cardiovascular status: acceptable and stable  Respiratory status: acceptable  Hydration status: acceptable    Comments: An Anesthesiologist personally participated in the most demanding procedures (including induction and emergence if applicable) in the anesthesia plan, monitored the course of anesthesia administration at frequent intervals and remained physically present and available for immediate diagnosis and treatment of emergencies.

## 2022-12-01 NOTE — ANESTHESIA PREPROCEDURE EVALUATION
Anesthesia Evaluation     Patient summary reviewed and Nursing notes reviewed   no history of anesthetic complications:  NPO Solid Status: > 8 hours  NPO Liquid Status: > 2 hours           Airway   Mallampati: III  TM distance: >3 FB  Neck ROM: full  Possible difficult intubation  Dental      Pulmonary - normal exam    breath sounds clear to auscultation  (+) sleep apnea,   Cardiovascular - normal exam  Exercise tolerance: good (4-7 METS)    Rhythm: regular  Rate: normal    (+) hypertension,       Neuro/Psych- negative ROS  GI/Hepatic/Renal/Endo    (+)  GI bleeding , diabetes mellitus,     Musculoskeletal (-) negative ROS    Abdominal    Substance History - negative use     OB/GYN negative ob/gyn ROS         Other - negative ROS       ROS/Med Hx Other: PAT Nursing Notes unavailable.                   Anesthesia Plan    ASA 3     general       Anesthetic plan, risks, benefits, and alternatives have been provided, discussed and informed consent has been obtained with: patient.        CODE STATUS:

## 2022-12-01 NOTE — H&P
Colonoscopy consultation        History of Present Illness  Bailey Reese is a 67 y.o. female presents to [facility] GENERAL SURGERY for colonoscopy consultation.     Patient presents today with complaints of bright red blood per rectum.  Patient reports she is only seeing this while wiping.  Admits to a change in bowel habit with narrow pencillike stools.  Denies any abdominal pain.     Patient reports that she had a colonoscopy last year with Dr. Smith and reports she has a benign mass in her colon.  After reviewing pathology pathology is consistent with a lipoma of the cecum.     Denies any family history of colorectal cancer.     8/21: EGD & Colonoscopy ([place]): Gastritis; Peptic duodenitisCecum- Colonic mucosa with ulceration and reactive epithelial changes; sigmoid-colonic mucosa with mild vascular congestion; diverticulosis; External & internal hemorrhoids.      5/21: Colonoscopy ([place]): Cecum -specimen sent to [facility] for further evaluation-benign biopsy; rectrosigmoid -colonic mucosa with mild superficial vascular congestion and focal acute inflammation; rectum -submucosal leiomyoma; diverticulosis; external hemorrhoids.              Objective              Past Medical History:   Diagnosis Date   • Allergic rhinitis     • Arthritis     • Concussion     • High blood pressure     • Limb pain     • Limb swelling                 Past Surgical History:   Procedure Laterality Date   • COLONOSCOPY       • COLONOSCOPY N/A 8/20/2021     Procedure: COLONOSCOPY WITH BIOPSIES;  Surgeon: Nura Smith MD;  Location: [facility] ENDOSCOPY;  Service: General;  Laterality: N/A;  CECUM MASS, DIVERTICULOSIS, HEMORRHOIDS, COLITIS   • ENDOSCOPY N/A 8/20/2021     Procedure: ESOPHAGOGASTRODUODENOSCOPY WITH BIOPSIES;  Surgeon: Nuar Smith MD;  Location: [facility] ENDOSCOPY;  Service: General;  Laterality: N/A;  DUODENITIS, HIATAL HERNIA   • FEMUR FRACTURE SURGERY       • GALLBLADDER  "SURGERY       • GASTRIC BANDING REMOVAL       • LAPAROSCOPIC GASTRIC BANDING       • REPLACEMENT TOTAL KNEE                    Outpatient Medications Marked as Taking for the 9/8/22 encounter (Office Visit) with Cadence Bear, DAIANA   Medication Sig Dispense Refill   • aspirin 81 MG chewable tablet Chew 81 mg Daily.       • cetirizine (zyrTEC) 10 MG tablet Take 10 mg by mouth Daily.       • FLUoxetine (PROzac) 10 MG capsule Prozac 10 mg oral capsule take 1 capsule (10 mg) by oral route once daily   Active       • furosemide (LASIX) 20 MG tablet         • gabapentin (NEURONTIN) 300 MG capsule Take 300 mg by mouth 3 (Three) Times a Day.       • glimepiride (AMARYL) 2 MG tablet TAKE 1 TABLET BY MOUTH DAILY with largest meal       • Glucosamine-Chondroitin 250-200 MG tablet Osteo Bi-Flex 250-200 mg oral tablet take 1 tablet by oral route daily   Active       • losartan-hydrochlorothiazide (HYZAAR) 100-25 MG per tablet Hyzaar 100-25 mg oral tablet take 1 tablet by oral route once daily   Active       • multivitamin with minerals tablet tablet multivitamin oral tablet take 1 tablet by oral route daily   Active                  Allergies   Allergen Reactions   • Adhesive Tape Palpitations               Family History   Problem Relation Age of Onset   • Diabetes Mother     • Diabetes Maternal Grandmother     • Malig Hyperthermia Neg Hx           Social History            Socioeconomic History   • Marital status:    Tobacco Use   • Smoking status: Never Smoker   • Smokeless tobacco: Never Used   Substance and Sexual Activity   • Alcohol use: Not Currently       Comment: WAS OCCASIONALLY   • Drug use: Never         Review of Systems   Constitutional: Negative for chills and fever.   Gastrointestinal: Positive for anal bleeding. Negative for abdominal distention, abdominal pain, blood in stool, constipation, diarrhea and rectal pain.         Vital Signs:   Pulse 100   Ht 157.5 cm (62\")   Wt (!) 140 kg (309 lb 6.4 oz)   " BMI 56.59 kg/m²      Physical Exam  Vitals and nursing note reviewed.   Constitutional:       General: She is not in acute distress.     Appearance: She is obese.   HENT:      Head: Normocephalic.   Cardiovascular:      Rate and Rhythm: Normal rate.   Pulmonary:      Effort: Pulmonary effort is normal.      Breath sounds: No stridor. No wheezing.   Abdominal:      Palpations: Abdomen is soft.      Tenderness: There is no guarding.   Musculoskeletal:         General: No deformity. Normal range of motion.   Skin:     General: Skin is warm and dry.      Coloration: Skin is not jaundiced.   Neurological:      General: No focal deficit present.      Mental Status: She is alert and oriented to person, place, and time.   Psychiatric:         Mood and Affect: Mood normal.         Thought Content: Thought content normal.                  Result Review    :            []?  Laboratory  []?  Radiology  [x]?  Pathology  []?  Microbiology  []?  EKG/Telemetry   []?  Cardiology/Vascular   [x]?  Old records  Today I have reviewed Dr. Smith's previous colonoscopies and pathology        Assessment      Assessment and Plan    Diagnoses and all orders for this visit:     1. BRBPR (bright red blood per rectum) (Primary)     2. Change in bowel habits     3. Cecum mass     Other orders  -     Sodium Sulfate-Mag Sulfate-KCl (Sutab) 4661-472-494 MG tablet; Take 24 tablets by mouth 1 (One) Time for 1 dose. Take as directed. Directions given in office  Dispense: 24 tablet; Refill: 0     sutab prep     Follow Up   Return for Schedule colonoscopy with Dr. Smith on 12/1/2022 at [facility].      Hospital arrival time: 11:30.     Possible risks/complications, benefits, and alternatives to surgical or invasive procedure have been explained to patient and/or legal guardian.     Patient has been evaluated and can tolerate anesthesia and/or sedation. Risks, benefits, and alternatives to anesthesia and sedation have been explained  to patient and/or legal guardian.  Patient verbalizes understanding and is will proceed with above plan.     Patient was given instructions and counseling regarding her condition or for health maintenance advice. Please see specific information pulled into the AVS if appropriate.

## 2022-12-05 LAB
CYTO UR: NORMAL
LAB AP CASE REPORT: NORMAL
LAB AP CLINICAL INFORMATION: NORMAL
PATH REPORT.FINAL DX SPEC: NORMAL
PATH REPORT.GROSS SPEC: NORMAL

## 2022-12-15 ENCOUNTER — OFFICE VISIT (OUTPATIENT)
Dept: SURGERY | Facility: CLINIC | Age: 67
End: 2022-12-15

## 2022-12-15 VITALS — BODY MASS INDEX: 51.91 KG/M2 | WEIGHT: 293 LBS | HEIGHT: 63 IN

## 2022-12-15 DIAGNOSIS — K63.89 COLONIC MASS: Primary | ICD-10-CM

## 2022-12-15 DIAGNOSIS — K57.90 DIVERTICULOSIS: ICD-10-CM

## 2022-12-15 PROCEDURE — 99212 OFFICE O/P EST SF 10 MIN: CPT | Performed by: SURGERY

## 2022-12-15 NOTE — PROGRESS NOTES
"Chief Complaint  Post-op Follow-up (Colonoscopy)    Subjective        Bailey Reese presents to [facility] GENERAL SURGERY  History of Present Illness    The patient is to be seen for follow-up after colonoscopy.    The patient reports that she is doing well. She states that she is eating and drinking normally as well as urinating and having normal bowel movements. The patient reports that she had a couple of bouts of bleeding in the early fall of 2022, but it was after constipation. She states that she was told that she had diverticulitis. The patient reports that she has not had anything since then.      Objective   Vital Signs:  Ht 160 cm (63\")   Wt (!) 139 kg (307 lb 3.2 oz)   BMI 54.42 kg/m²   Estimated body mass index is 54.42 kg/m² as calculated from the following:    Height as of this encounter: 160 cm (63\").    Weight as of this encounter: 139 kg (307 lb 3.2 oz).          Physical Exam   Result Review :             No acute distress. Nonlabored breathing. Abdomen is soft.     Assessment and Plan   There are no diagnoses linked to this encounter.    Patient status post colonoscopy.  She had some previous findings of severe inflammation in her cecum and we have done repeat colonoscopy secondary to this inflammation. Her biopsies on most recent colonoscopy shows some continued inflammation, but mild.    Patient with chronic colonic inflammation of unknown etiology.  I offered her a referral to gastroenterology to evaluate for chronic colitis, but at this point in time, she wants to hold off. We will continue to monitor for symptoms and I will plan for repeat colonoscopy in 2 years. I have discussed with the patient. All questions were answered. She voiced understanding and agreed to proceed with the above plan.         Follow Up   No follow-ups on file.  Patient was given instructions and counseling regarding her condition or for health maintenance advice. Please see specific information " pulled into the AVS if appropriate.     Transcribed from ambient dictation for Nura Smith MD by Vaibhav Casanova.  12/15/22   10:39 EST    Patient or patient representative verbalized consent to the visit recording.  I have personally performed the services described in this document as transcribed by the above individual, and it is both accurate and complete.

## 2024-05-15 ENCOUNTER — TRANSCRIBE ORDERS (OUTPATIENT)
Dept: DIABETES SERVICES | Facility: HOSPITAL | Age: 69
End: 2024-05-15
Payer: MEDICARE

## 2024-05-15 DIAGNOSIS — E11.21 TYPE 2 DIABETES MELLITUS WITH DIABETIC NEPHROPATHY, UNSPECIFIED WHETHER LONG TERM INSULIN USE: Primary | ICD-10-CM

## 2024-08-01 ENCOUNTER — TELEPHONE (OUTPATIENT)
Dept: SURGERY | Facility: CLINIC | Age: 69
End: 2024-08-01
Payer: MEDICARE

## 2024-08-01 NOTE — TELEPHONE ENCOUNTER
PATIENT CALLED.  SHE HAS HAS A 2-YEAR COLON CONSULT RECALL FOR DR. GRACE.  THE FIRST AVAILABLE APPOINTMENT THE [facility] HAS IS IN JANUARY, 2025, AND PATIENT SAID SHE CANNOT WAIT THAT LONG.    SHE SAID THAT SHE WENT OUT TO EAT LAST WEEK AND SHE GOT DIARRHEA WITH A SUBSTANTIAL AMOUNT OF BLOOD IN IT.  THE DIARRHEA HAS GONE, BUT SHE IS STILL SPOTTING BLOOD.    SHE SAID SHE HAD 3 COLONOSCOPIES WITH US WITHIN A YEAR AND A HALF.  SHE SAID 2 LABS THOUGHT IT WAS NOTHING, AND ONE THOUGHT IS WAS SOMETHING.    SHE SAID SHE HAS HAD AN ISSUE WITH HEMORRHOIDS SINCE SHE HAD HER KIDS.  SHE HASN'T SEEN ANYONE HER FOR THEM, BUT SAID DR. GRACE WAS AWARE.    SHE IS ASKING FOR AN APPOINTMENT SOON.    #[phone]

## 2024-08-28 ENCOUNTER — PREP FOR SURGERY (OUTPATIENT)
Dept: OTHER | Facility: HOSPITAL | Age: 69
End: 2024-08-28
Payer: MEDICARE

## 2024-08-28 ENCOUNTER — OFFICE VISIT (OUTPATIENT)
Dept: SURGERY | Facility: CLINIC | Age: 69
End: 2024-08-28
Payer: MEDICARE

## 2024-08-28 VITALS
WEIGHT: 286 LBS | HEIGHT: 63 IN | HEART RATE: 64 BPM | SYSTOLIC BLOOD PRESSURE: 130 MMHG | DIASTOLIC BLOOD PRESSURE: 83 MMHG | BODY MASS INDEX: 50.68 KG/M2

## 2024-08-28 DIAGNOSIS — R19.7 DIARRHEA, UNSPECIFIED TYPE: ICD-10-CM

## 2024-08-28 DIAGNOSIS — K62.5 BRBPR (BRIGHT RED BLOOD PER RECTUM): ICD-10-CM

## 2024-08-28 DIAGNOSIS — R19.7 DIARRHEA, UNSPECIFIED TYPE: Primary | ICD-10-CM

## 2024-08-28 DIAGNOSIS — K62.5 BRBPR (BRIGHT RED BLOOD PER RECTUM): Primary | ICD-10-CM

## 2024-08-28 RX ORDER — PREGABALIN 100 MG/1
1 CAPSULE ORAL EVERY 12 HOURS SCHEDULED
COMMUNITY
Start: 2024-08-26

## 2024-08-28 RX ORDER — TIRZEPATIDE 7.5 MG/.5ML
INJECTION, SOLUTION SUBCUTANEOUS
COMMUNITY
Start: 2024-07-25

## 2024-08-28 RX ORDER — SODIUM CHLORIDE 0.9 % (FLUSH) 0.9 %
3 SYRINGE (ML) INJECTION EVERY 12 HOURS SCHEDULED
OUTPATIENT
Start: 2024-08-28

## 2024-08-28 RX ORDER — NICOTINE POLACRILEX 2 MG
GUM BUCCAL
COMMUNITY
End: 2024-08-30

## 2024-08-28 RX ORDER — POLYETHYLENE GLYCOL 3350 17 G/17G
POWDER, FOR SOLUTION ORAL
Qty: 238 PACKET | Refills: 0 | Status: SHIPPED | OUTPATIENT
Start: 2024-08-28 | End: 2024-08-30

## 2024-08-28 RX ORDER — SODIUM CHLORIDE 9 MG/ML
40 INJECTION, SOLUTION INTRAVENOUS AS NEEDED
OUTPATIENT
Start: 2024-08-28

## 2024-08-28 RX ORDER — SODIUM CHLORIDE 0.9 % (FLUSH) 0.9 %
10 SYRINGE (ML) INJECTION AS NEEDED
OUTPATIENT
Start: 2024-08-28

## 2024-08-28 NOTE — PROGRESS NOTES
Chief Complaint: Colonoscopy    Subjective      Colonoscopy consultation       History of Present Illness  Bailey Reese is a 69 y.o. female presents to [facility] for colonoscopy consultation.    Patient presents today with complaints of diarrhea associated with some bright red blood per rectum.  Patient reports that usually her rectal bleeding is seen while wiping.  She reports it is significant enough she sees it in the toilet.  She denies any abdominal pain or change in bowel habit.  Denies any family history of colorectal cancer.    Patient admits to DARIANA.  Does use a CPAP    Denies any cardiac issues.    Patient does use Mounjaro.      12/22: Colonoscopy ([place]); cecum - chronic inflammation; Sigmoid- non- specific acute inflammation.     8/21: EGD & Colonoscopy ([place]): Gastritis; Peptic duodenitisCecum- Colonic mucosa with ulceration and reactive epithelial changes; sigmoid-colonic mucosa with mild vascular congestion; diverticulosis; External & internal hemorrhoids.      5/21: Colonoscopy ([place]): Cecum -specimen sent to [facility] for further evaluation-benign biopsy; rectrosigmoid -colonic mucosa with mild superficial vascular congestion and focal acute inflammation; rectum -submucosal leiomyoma; diverticulosis; external hemorrhoids.       Objective     Past Medical History:   Diagnosis Date    Allergic rhinitis     Arthritis     Concussion     Depression     Diabetes mellitus     High blood pressure     Limb pain     Limb swelling     Neuropathy     Sleep apnea     cpap       Past Surgical History:   Procedure Laterality Date    COLONOSCOPY      COLONOSCOPY N/A 08/20/2021    Procedure: COLONOSCOPY WITH BIOPSIES;  Surgeon: Nura Smith MD;  Location: [facility] ENDOSCOPY;  Service: General;  Laterality: N/A;  CECUM MASS, DIVERTICULOSIS, HEMORRHOIDS, COLITIS    COLONOSCOPY N/A 12/1/2022    Procedure: COLONOSCOPY with biopies.;  Surgeon: Nura Smith  MD ERNIE;  Location: [facility] ENDOSCOPY;  Service: General;  Laterality: N/A;  DIVERTICULOSIS,HEMORRHOIDS, COLITIS    ENDOSCOPY N/A 08/20/2021    Procedure: ESOPHAGOGASTRODUODENOSCOPY WITH BIOPSIES;  Surgeon: Nura Smith MD;  Location: [facility] ENDOSCOPY;  Service: General;  Laterality: N/A;  DUODENITIS, HIATAL HERNIA    FEMUR FRACTURE SURGERY      GALLBLADDER SURGERY      GASTRIC BANDING REMOVAL      LAPAROSCOPIC GASTRIC BANDING      REPLACEMENT TOTAL KNEE Bilateral        Outpatient Medications Marked as Taking for the 8/28/24 encounter (Office Visit) with Chema April, APRN   Medication Sig Dispense Refill    acetaminophen (TYLENOL) 325 MG tablet Tylenol 325 mg oral tablet take 1 tablet (325 mg) by oral route every 4 hours as needed   Active      Biotin 1 MG capsule Take  by mouth.      cetirizine (zyrTEC) 10 MG tablet Take 1 tablet by mouth As Needed.      FLUoxetine (PROzac) 10 MG capsule Prozac 10 mg oral capsule take 1 capsule (10 mg) by oral route once daily   Active      furosemide (LASIX) 20 MG tablet Take 1 tablet by mouth Daily.      Glucosamine-Chondroitin 250-200 MG tablet Osteo Bi-Flex 250-200 mg oral tablet take 1 tablet by oral route daily   Active      losartan-hydrochlorothiazide (HYZAAR) 100-25 MG per tablet Hyzaar 100-25 mg oral tablet take 1 tablet by oral route once daily   Active      Mounjaro 7.5 MG/0.5ML solution pen-injector pen       multivitamin with minerals tablet tablet multivitamin oral tablet take 1 tablet by oral route daily   Active      Potassium 99 MG tablet Take  by mouth.      pregabalin (LYRICA) 100 MG capsule Take 1 capsule by mouth Every 12 (Twelve) Hours.         Allergies   Allergen Reactions    Adhesive Tape Palpitations        Family History   Problem Relation Age of Onset    Diabetes Mother     Diabetes Maternal Grandmother     Malig Hyperthermia Neg Hx        Social History     Socioeconomic History    Marital status:    Tobacco Use    Smoking status: Never      "Passive exposure: Never    Smokeless tobacco: Never   Vaping Use    Vaping status: Never Used   Substance and Sexual Activity    Alcohol use: Not Currently     Comment: WAS OCCASIONALLY    Drug use: Never       Review of Systems   Constitutional:  Negative for chills and fever.   Gastrointestinal:  Positive for anal bleeding, blood in stool and diarrhea. Negative for abdominal distention, abdominal pain, constipation and rectal pain.        Vital Signs:   /83 (BP Location: Right arm, Patient Position: Sitting, Cuff Size: Large Adult)   Pulse 64   Ht 160 cm (63\")   Wt 130 kg (286 lb)   BMI 50.66 kg/m²      Physical Exam  Vitals and nursing note reviewed.   Constitutional:       General: She is not in acute distress.     Appearance: Normal appearance. She is not ill-appearing.   HENT:      Head: Normocephalic and atraumatic.   Cardiovascular:      Rate and Rhythm: Normal rate.   Pulmonary:      Effort: No respiratory distress.      Breath sounds: Normal breath sounds.   Abdominal:      Palpations: Abdomen is soft.      Tenderness: There is no guarding.   Musculoskeletal:         General: No deformity. Normal range of motion.   Skin:     General: Skin is warm and dry.      Coloration: Skin is not jaundiced.   Neurological:      General: No focal deficit present.      Mental Status: She is alert and oriented to person, place, and time.   Psychiatric:         Mood and Affect: Mood normal.         Thought Content: Thought content normal.          Result Review :          []  Laboratory  []  Radiology  []  Pathology  []  Microbiology  []  EKG/Telemetry   []  Cardiology/Vascular   []  Old records  I spent 15 minutes caring for Bailey on this date of service. This time includes time spent by me in the following activities: reviewing tests, obtaining and/or reviewing a separately obtained history, performing a medically appropriate examination and/or evaluation, ordering medications, tests, or procedures, and " documenting information in the medical record.     Assessment and Plan    Diagnoses and all orders for this visit:    1. BRBPR (bright red blood per rectum) (Primary)    2. Diarrhea, unspecified type    Other orders  -     polyethylene glycol (MIRALAX) 17 g packet; Take as directed.  Instructions given in office.  Dispense: 238 g bottle  Dispense: 238 packet; Refill: 0    Hold Mounjaro starting on 10/28/24.        Follow Up   Return for Schedule colonoscopy with Dr. Smith on 10/29/2024 [facility], [place], operated by [facility].    Hospital arrival time: 0630    Possible risks/complications, benefits, and alternatives to surgical or invasive procedures have been explained to patient and/or legal guardian.    Patient has been evaluated and can tolerate anesthesia and/or sedation. Risks, benefits, and alternatives to anesthesia and sedation have been explained to the patient and/or legal guardian. Patient verbalizes understanding and is willing to proceed with the above plan.     Patient was given instructions and counseling regarding her condition or for health maintenance advice. Please see specific information pulled into the AVS if appropriate.     Thank you for allowing me to participate in the care of this patient. Please call with questions or concerns.

## 2024-08-30 ENCOUNTER — OFFICE VISIT (OUTPATIENT)
Dept: DIABETES SERVICES | Facility: HOSPITAL | Age: 69
End: 2024-08-30
Payer: MEDICARE

## 2024-08-30 VITALS
HEIGHT: 63 IN | SYSTOLIC BLOOD PRESSURE: 128 MMHG | OXYGEN SATURATION: 98 % | HEART RATE: 80 BPM | BODY MASS INDEX: 50.11 KG/M2 | WEIGHT: 282.8 LBS | DIASTOLIC BLOOD PRESSURE: 71 MMHG | TEMPERATURE: 97.6 F

## 2024-08-30 DIAGNOSIS — E11.42 CONTROLLED TYPE 2 DIABETES MELLITUS WITH DIABETIC POLYNEUROPATHY, WITH LONG-TERM CURRENT USE OF INSULIN: Primary | ICD-10-CM

## 2024-08-30 DIAGNOSIS — E66.01 SEVERE OBESITY (BMI >= 40): ICD-10-CM

## 2024-08-30 DIAGNOSIS — Z79.4 CONTROLLED TYPE 2 DIABETES MELLITUS WITH DIABETIC POLYNEUROPATHY, WITH LONG-TERM CURRENT USE OF INSULIN: Primary | ICD-10-CM

## 2024-08-30 LAB — GLUCOSE BLDC GLUCOMTR-MCNC: 113 MG/DL (ref 70–99)

## 2024-08-30 PROCEDURE — 82043 UR ALBUMIN QUANTITATIVE: CPT | Performed by: NURSE PRACTITIONER

## 2024-08-30 PROCEDURE — G0463 HOSPITAL OUTPT CLINIC VISIT: HCPCS | Performed by: NURSE PRACTITIONER

## 2024-08-30 PROCEDURE — 82948 REAGENT STRIP/BLOOD GLUCOSE: CPT | Performed by: NURSE PRACTITIONER

## 2024-08-30 PROCEDURE — 82570 ASSAY OF URINE CREATININE: CPT | Performed by: NURSE PRACTITIONER
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
## 2024-08-31 LAB
ALBUMIN UR-MCNC: <1.2 MG/DL
CREAT UR-MCNC: 47.3 MG/DL
MICROALBUMIN/CREAT UR: NORMAL MG/G{CREAT}

## 2024-10-16 NOTE — PRE-PROCEDURE INSTRUCTIONS
Reminded of arrival time at   0630      , Entrance C of the [facility] hospital.   Instructed to bring picture ID and Insurance Card. Have a  over the age of 18 to drive you home the day of procedure.      Clear liquid diet the day before  the procedure, nothing red or purple. Call with any questions about the prep or if in need of the prep    Bring medication list the day of the procedure    Stop Mounjaro 1 week prior to procedure last dose 10/21/24    Call [phone] to confirm arrival time

## 2024-10-18 NOTE — PRE-PROCEDURE INSTRUCTIONS
Reminded of arrival time at 0630        , Entrance C of the [facility]. Instructed to bring or have a  over the age of 18 set up to drive you home the day of procedure.    Instructed on clear liquid diet the day before, nothing red or purple. Call with any questions about the prep or if in need of the prep.  Reminded them not to eat or drink anything am of procedure unless its a sip of water with medications.  Hold mounjaro 7 days prior to procedure, hold hyzar, lasix am of procedure.

## 2024-10-23 ENCOUNTER — TELEPHONE (OUTPATIENT)
Dept: UROLOGY | Facility: CLINIC | Age: 69
End: 2024-10-23
Payer: MEDICARE

## 2024-10-23 NOTE — TELEPHONE ENCOUNTER
I have called and spoke to the patient about her bowel prep. We went back over it and she will call back with any other questions. She V/U.

## 2024-10-23 NOTE — TELEPHONE ENCOUNTER
Patient called, she has a colonoscopy scheduled with Dr Smith on 10/29. She was given Miralax and Dulcolax to take as the prep. She stated that she is weary about the dosages, she says it says on the on the prep  to take 14 doses of the Miralax and 6 doses of the Dulcolax. Please call patient and advise

## 2024-10-28 ENCOUNTER — ANESTHESIA EVENT (OUTPATIENT)
Dept: GASTROENTEROLOGY | Facility: HOSPITAL | Age: 69
End: 2024-10-28
Payer: MEDICARE

## 2024-10-28 NOTE — ANESTHESIA PREPROCEDURE EVALUATION
Anesthesia Evaluation     NPO Solid Status: > 8 hours  NPO Liquid Status: > 8 hours           Airway   Mallampati: II  TM distance: >3 FB  Neck ROM: full  Dental - normal exam     Pulmonary - normal exam   (+) ,sleep apnea  Cardiovascular - normal exam  Exercise tolerance: good (4-7 METS)    (+) hypertension      Neuro/Psych  (+) psychiatric history Depression  GI/Hepatic/Renal/Endo    (+) morbid obesity, GI bleeding , diabetes mellitus type 2 well controlled    Musculoskeletal     Abdominal   (+) obese   Substance History      OB/GYN          Other   arthritis,     ROS/Med Hx Other: Diarrhea, BRBPR    Last dose Mounjaro- 10/14 per phone conversation     No EKG on file                     Anesthesia Plan    ASA 3     general   total IV anesthesia  (Total IV Anesthesia    Patient understands anesthesia not responsible for dental damage.  )  intravenous induction     Anesthetic plan, risks, benefits, and alternatives have been provided, discussed and informed consent has been obtained with: patient.    Plan discussed with CRNA.      CODE STATUS:

## 2024-10-29 ENCOUNTER — TELEPHONE (OUTPATIENT)
Dept: DIABETES SERVICES | Facility: HOSPITAL | Age: 69
End: 2024-10-29
Payer: MEDICARE

## 2024-10-29 ENCOUNTER — HOSPITAL ENCOUNTER (OUTPATIENT)
Facility: HOSPITAL | Age: 69
Setting detail: HOSPITAL OUTPATIENT SURGERY
Discharge: HOME | End: 2024-10-29
Attending: SURGERY | Admitting: SURGERY
Payer: MEDICARE

## 2024-10-29 ENCOUNTER — ANESTHESIA (OUTPATIENT)
Dept: GASTROENTEROLOGY | Facility: HOSPITAL | Age: 69
End: 2024-10-29
Payer: MEDICARE

## 2024-10-29 VITALS
HEART RATE: 68 BPM | OXYGEN SATURATION: 98 % | RESPIRATION RATE: 18 BRPM | BODY MASS INDEX: 50.14 KG/M2 | SYSTOLIC BLOOD PRESSURE: 121 MMHG | TEMPERATURE: 97.1 F | DIASTOLIC BLOOD PRESSURE: 72 MMHG | WEIGHT: 283.07 LBS

## 2024-10-29 DIAGNOSIS — K62.5 BRBPR (BRIGHT RED BLOOD PER RECTUM): ICD-10-CM

## 2024-10-29 DIAGNOSIS — R19.7 DIARRHEA, UNSPECIFIED TYPE: ICD-10-CM

## 2024-10-29 LAB — GLUCOSE BLDC GLUCOMTR-MCNC: 121 MG/DL (ref 70–99)

## 2024-10-29 PROCEDURE — 82948 REAGENT STRIP/BLOOD GLUCOSE: CPT

## 2024-10-29 PROCEDURE — 25810000003 LACTATED RINGERS PER 1000 ML

## 2024-10-29 PROCEDURE — 25010000002 PROPOFOL 10 MG/ML EMULSION: Performed by: NURSE ANESTHETIST, CERTIFIED REGISTERED

## 2024-10-29 PROCEDURE — 88305 TISSUE EXAM BY PATHOLOGIST: CPT | Performed by: SURGERY

## 2024-10-29 PROCEDURE — 25010000002 LIDOCAINE PF 2% 2 % SOLUTION: Performed by: NURSE ANESTHETIST, CERTIFIED REGISTERED

## 2024-10-29 RX ORDER — SODIUM CHLORIDE, SODIUM LACTATE, POTASSIUM CHLORIDE, CALCIUM CHLORIDE 600; 310; 30; 20 MG/100ML; MG/100ML; MG/100ML; MG/100ML
30 INJECTION, SOLUTION INTRAVENOUS CONTINUOUS
Status: DISCONTINUED | OUTPATIENT
Start: 2024-10-29 | End: 2024-10-29 | Stop reason: HOSPADM

## 2024-10-29 RX ORDER — SODIUM CHLORIDE 0.9 % (FLUSH) 0.9 %
3 SYRINGE (ML) INJECTION EVERY 12 HOURS SCHEDULED
Status: DISCONTINUED | OUTPATIENT
Start: 2024-10-29 | End: 2024-10-29 | Stop reason: HOSPADM

## 2024-10-29 RX ORDER — PROPOFOL 10 MG/ML
VIAL (ML) INTRAVENOUS AS NEEDED
Status: DISCONTINUED | OUTPATIENT
Start: 2024-10-29 | End: 2024-10-29 | Stop reason: SURG

## 2024-10-29 RX ORDER — SODIUM CHLORIDE 0.9 % (FLUSH) 0.9 %
10 SYRINGE (ML) INJECTION AS NEEDED
Status: DISCONTINUED | OUTPATIENT
Start: 2024-10-29 | End: 2024-10-29 | Stop reason: HOSPADM

## 2024-10-29 RX ORDER — TIRZEPATIDE 10 MG/.5ML
10 INJECTION, SOLUTION SUBCUTANEOUS WEEKLY
Qty: 2 ML | Refills: 2 | Status: SHIPPED | OUTPATIENT
Start: 2024-10-29 | End: 2024-11-01 | Stop reason: SDUPTHER

## 2024-10-29 RX ORDER — FLUOXETINE 10 MG/1
10 CAPSULE ORAL DAILY
COMMUNITY

## 2024-10-29 RX ORDER — LIDOCAINE HYDROCHLORIDE 20 MG/ML
INJECTION, SOLUTION EPIDURAL; INFILTRATION; INTRACAUDAL; PERINEURAL AS NEEDED
Status: DISCONTINUED | OUTPATIENT
Start: 2024-10-29 | End: 2024-10-29 | Stop reason: SURG

## 2024-10-29 RX ORDER — SODIUM CHLORIDE 9 MG/ML
40 INJECTION, SOLUTION INTRAVENOUS AS NEEDED
Status: DISCONTINUED | OUTPATIENT
Start: 2024-10-29 | End: 2024-10-29 | Stop reason: HOSPADM

## 2024-10-29 RX ADMIN — SODIUM CHLORIDE, POTASSIUM CHLORIDE, SODIUM LACTATE AND CALCIUM CHLORIDE 30 ML/HR: 600; 310; 30; 20 INJECTION, SOLUTION INTRAVENOUS at 07:16

## 2024-10-29 RX ADMIN — PROPOFOL 50 MG: 10 INJECTION, EMULSION INTRAVENOUS at 08:32

## 2024-10-29 RX ADMIN — PROPOFOL 200 MCG/KG/MIN: 10 INJECTION, EMULSION INTRAVENOUS at 08:32

## 2024-10-29 RX ADMIN — PROPOFOL 50 MG: 10 INJECTION, EMULSION INTRAVENOUS at 08:36

## 2024-10-29 RX ADMIN — LIDOCAINE HYDROCHLORIDE 100 MG: 20 INJECTION, SOLUTION EPIDURAL; INFILTRATION; INTRACAUDAL; PERINEURAL at 08:32

## 2024-10-29 NOTE — ANESTHESIA POSTPROCEDURE EVALUATION
Patient: Bailey Reese    Procedure Summary       Date: 10/29/24 Room / Location: [facility] ENDOSCOPY 3 / [facility] ENDOSCOPY    Anesthesia Start: 0829 Anesthesia Stop: 0913    Procedure: COLONOSCOPY WITH BIOPSIES Diagnosis:       Diarrhea, unspecified type      BRBPR (bright red blood per rectum)      (Diarrhea, unspecified type [R19.7])      (BRBPR (bright red blood per rectum) [K62.5])    Surgeons: Nura Smith MD Provider: Jordin Eduardo CRNA    Anesthesia Type: general ASA Status: 3            Anesthesia Type: general    Vitals  Vitals Value Taken Time   /72 10/29/24 0925   Temp 36.2 °C (97.1 °F) 10/29/24 0925   Pulse 65 10/29/24 0927   Resp 18 10/29/24 0925   SpO2 98 % 10/29/24 0927   Vitals shown include unfiled device data.        Post Anesthesia Care and Evaluation    Post-procedure mental status: acceptable.  Pain management: satisfactory to patient    Airway patency: patent  Anesthetic complications: No anesthetic complications    Cardiovascular status: acceptable  Respiratory status: acceptable    Comments: Per chart review

## 2024-10-29 NOTE — H&P
Colonoscopy consultation        History of Present Illness  Bailey Reese is a 69 y.o. female presents to [facility] GENERAL SURGERY for colonoscopy consultation.     Patient presents today with complaints of diarrhea associated with some bright red blood per rectum.  Patient reports that usually her rectal bleeding is seen while wiping.  She reports it is significant enough she sees it in the toilet.  She denies any abdominal pain or change in bowel habit.  Denies any family history of colorectal cancer.     Patient admits to DARIANA.  Does use a CPAP     Denies any cardiac issues.     Patient does use Mounjaro.        12/22: Colonoscopy (Luis); cecum - chronic inflammation; Sigmoid- non- specific acute inflammation.      8/21: EGD & Colonoscopy (Dim[facility]): Gastritis; Peptic duodenitisCecum- Colonic mucosa with ulceration and reactive epithelial changes; sigmoid-colonic mucosa with mild vascular congestion; diverticulosis; External & internal hemorrhoids.      5/21: Colonoscopy (Luis): Cecum -specimen sent to [facility] for further evaluation-benign biopsy; rectrosigmoid -colonic mucosa with mild superficial vascular congestion and focal acute inflammation; rectum -submucosal leiomyoma; diverticulosis; external hemorrhoids.           Objective  Medical History        Past Medical History:   Diagnosis Date    Allergic rhinitis      Arthritis      Concussion      Depression      Diabetes mellitus      High blood pressure      Limb pain      Limb swelling      Neuropathy      Sleep apnea       cpap            Surgical History         Past Surgical History:   Procedure Laterality Date    COLONOSCOPY        COLONOSCOPY N/A 08/20/2021     Procedure: COLONOSCOPY WITH BIOPSIES;  Surgeon: Nura Smith MD;  Location: [facility] ENDOSCOPY;  Service: General;  Laterality: N/A;  CECUM MASS, DIVERTICULOSIS, HEMORRHOIDS, COLITIS    COLONOSCOPY N/A 12/1/2022     Procedure: COLONOSCOPY with  biopies.;  Surgeon: Nura Smith MD;  Location: [facility] ENDOSCOPY;  Service: General;  Laterality: N/A;  DIVERTICULOSIS,HEMORRHOIDS, COLITIS    ENDOSCOPY N/A 08/20/2021     Procedure: ESOPHAGOGASTRODUODENOSCOPY WITH BIOPSIES;  Surgeon: Nura Smith MD;  Location: [facility] ENDOSCOPY;  Service: General;  Laterality: N/A;  DUODENITIS, HIATAL HERNIA    FEMUR FRACTURE SURGERY        GALLBLADDER SURGERY        GASTRIC BANDING REMOVAL        LAPAROSCOPIC GASTRIC BANDING        REPLACEMENT TOTAL KNEE Bilateral              Medications Taking          Outpatient Medications Marked as Taking for the 8/28/24 encounter (Office Visit) with Chema April, APRN   Medication Sig Dispense Refill    acetaminophen (TYLENOL) 325 MG tablet Tylenol 325 mg oral tablet take 1 tablet (325 mg) by oral route every 4 hours as needed   Active        Biotin 1 MG capsule Take  by mouth.        cetirizine (zyrTEC) 10 MG tablet Take 1 tablet by mouth As Needed.        FLUoxetine (PROzac) 10 MG capsule Prozac 10 mg oral capsule take 1 capsule (10 mg) by oral route once daily   Active        furosemide (LASIX) 20 MG tablet Take 1 tablet by mouth Daily.        Glucosamine-Chondroitin 250-200 MG tablet Osteo Bi-Flex 250-200 mg oral tablet take 1 tablet by oral route daily   Active        losartan-hydrochlorothiazide (HYZAAR) 100-25 MG per tablet Hyzaar 100-25 mg oral tablet take 1 tablet by oral route once daily   Active        Mounjaro 7.5 MG/0.5ML solution pen-injector pen          multivitamin with minerals tablet tablet multivitamin oral tablet take 1 tablet by oral route daily   Active        Potassium 99 MG tablet Take  by mouth.        pregabalin (LYRICA) 100 MG capsule Take 1 capsule by mouth Every 12 (Twelve) Hours.                Allergies        Allergies   Allergen Reactions    Adhesive Tape Palpitations                  Family History   Problem Relation Age of Onset    Diabetes Mother      Diabetes Maternal Grandmother      [place]eh  "Hyperthermia Neg Hx           Social History   Social History            Socioeconomic History    Marital status:    Tobacco Use    Smoking status: Never       Passive exposure: Never    Smokeless tobacco: Never   Vaping Use    Vaping status: Never Used   Substance and Sexual Activity    Alcohol use: Not Currently       Comment: WAS OCCASIONALLY    Drug use: Never            Review of Systems   Constitutional:  Negative for chills and fever.   Gastrointestinal:  Positive for anal bleeding, blood in stool and diarrhea. Negative for abdominal distention, abdominal pain, constipation and rectal pain.         Vital Signs:   /83 (BP Location: Right arm, Patient Position: Sitting, Cuff Size: Large Adult)   Pulse 64   Ht 160 cm (63\")   Wt 130 kg (286 lb)   BMI 50.66 kg/m²      Physical Exam  Vitals and nursing note reviewed.   Constitutional:       General: She is not in acute distress.     Appearance: Normal appearance. She is not ill-appearing.   HENT:      Head: Normocephalic and atraumatic.   Cardiovascular:      Rate and Rhythm: Normal rate.   Pulmonary:      Effort: No respiratory distress.      Breath sounds: Normal breath sounds.   Abdominal:      Palpations: Abdomen is soft.      Tenderness: There is no guarding.   Musculoskeletal:         General: No deformity. Normal range of motion.   Skin:     General: Skin is warm and dry.      Coloration: Skin is not jaundiced.   Neurological:      General: No focal deficit present.      Mental Status: She is alert and oriented to person, place, and time.   Psychiatric:         Mood and Affect: Mood normal.         Thought Content: Thought content normal.               Result Review  :            []  Laboratory  []  Radiology  []  Pathology  []  Microbiology  []  EKG/Telemetry   []  Cardiology/Vascular   []  Old records  I spent 15 minutes caring for Bailey on this date of service. This time includes time spent by me in the following activities: reviewing " tests, obtaining and/or reviewing a separately obtained history, performing a medically appropriate examination and/or evaluation, ordering medications, tests, or procedures, and documenting information in the medical record.     Assessment  Assessment and Plan    Diagnoses and all orders for this visit:     1. BRBPR (bright red blood per rectum) (Primary)     2. Diarrhea, unspecified type     Other orders  -     polyethylene glycol (MIRALAX) 17 g packet; Take as directed.  Instructions given in office.  Dispense: 238 g bottle  Dispense: 238 packet; Refill: 0     Hold Mounjaro starting on 10/28/24.           Follow Up   Return for Schedule colonoscopy with Dr. Smith on 10/29/2024 [facility].     Hospital arrival time: 0630     Possible risks/complications, benefits, and alternatives to surgical or invasive procedures have been explained to patient and/or legal guardian.     Patient has been evaluated and can tolerate anesthesia and/or sedation. Risks, benefits, and alternatives to anesthesia and sedation have been explained to the patient and/or legal guardian. Patient verbalizes understanding and is willing to proceed with the above plan.      Patient was given instructions and counseling regarding her condition or for health maintenance advice. Please see specific information pulled into the AVS if appropriate.      Thank you for allowing me to participate in the care of this patient. Please call with questions or concerns.

## 2024-10-29 NOTE — TELEPHONE ENCOUNTER
Patient stopped by office and is requesting refill on her Mounjaro and also the strength to be increased. Please use [facility] mail order [facility].

## 2024-10-29 NOTE — INTERVAL H&P NOTE
H&P reviewed. The patient was examined and there are no changes to the H&P.         Remote device check.  Please see link for full device report.

## 2024-11-01 RX ORDER — TIRZEPATIDE 10 MG/.5ML
10 INJECTION, SOLUTION SUBCUTANEOUS WEEKLY
Qty: 6 ML | Refills: 1 | Status: SHIPPED | OUTPATIENT
Start: 2024-11-01

## 2024-11-01 RX ORDER — TIRZEPATIDE 10 MG/.5ML
10 INJECTION, SOLUTION SUBCUTANEOUS WEEKLY
Qty: 2 ML | Refills: 2 | Status: SHIPPED | OUTPATIENT
Start: 2024-11-01 | End: 2024-11-01 | Stop reason: SDUPTHER

## 2024-11-01 NOTE — TELEPHONE ENCOUNTER
MEDICATION WAS SUPPOSED TO GO TO [facility]   [facility]  [id number]  [place] [address]  PHONE NUMBER [phone]   PLEASE SEND MEDICATION TO THIS  MAIL ORDER PHARMACY    Tirzepatide (Mounjaro) 10 MG/0.5ML solution auto-injector

## 2024-11-19 ENCOUNTER — OFFICE VISIT (OUTPATIENT)
Dept: SURGERY | Facility: CLINIC | Age: 69
End: 2024-11-19
Payer: MEDICARE

## 2024-11-19 VITALS — HEIGHT: 63 IN | RESPIRATION RATE: 16 BRPM | BODY MASS INDEX: 49.26 KG/M2 | WEIGHT: 278 LBS

## 2024-11-19 DIAGNOSIS — K63.89 COLONIC MASS: Primary | ICD-10-CM

## 2024-11-19 PROCEDURE — 99024 POSTOP FOLLOW-UP VISIT: CPT | Performed by: SURGERY
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
## 2024-11-21 ENCOUNTER — OFFICE VISIT (OUTPATIENT)
Dept: DIABETES SERVICES | Facility: HOSPITAL | Age: 69
End: 2024-11-21
Payer: MEDICARE

## 2024-11-21 VITALS
BODY MASS INDEX: 49.47 KG/M2 | HEIGHT: 63 IN | TEMPERATURE: 98.1 F | DIASTOLIC BLOOD PRESSURE: 54 MMHG | HEART RATE: 68 BPM | SYSTOLIC BLOOD PRESSURE: 117 MMHG | WEIGHT: 279.2 LBS | OXYGEN SATURATION: 98 %

## 2024-11-21 DIAGNOSIS — Z79.4 CONTROLLED TYPE 2 DIABETES MELLITUS WITH DIABETIC POLYNEUROPATHY, WITH LONG-TERM CURRENT USE OF INSULIN: Primary | ICD-10-CM

## 2024-11-21 DIAGNOSIS — E11.42 CONTROLLED TYPE 2 DIABETES MELLITUS WITH DIABETIC POLYNEUROPATHY, WITH LONG-TERM CURRENT USE OF INSULIN: Primary | ICD-10-CM

## 2024-11-21 DIAGNOSIS — E66.01 SEVERE OBESITY (BMI >= 40): ICD-10-CM

## 2024-11-21 LAB
EXPIRATION DATE: NORMAL
GLUCOSE BLDC GLUCOMTR-MCNC: 100 MG/DL (ref 70–99)
HBA1C MFR BLD: 5.6 % (ref 4.5–5.7)
Lab: NORMAL

## 2024-11-21 PROCEDURE — 1159F MED LIST DOCD IN RCRD: CPT | Performed by: NURSE PRACTITIONER

## 2024-11-21 PROCEDURE — 3074F SYST BP LT 130 MM HG: CPT | Performed by: NURSE PRACTITIONER

## 2024-11-21 PROCEDURE — 1160F RVW MEDS BY RX/DR IN RCRD: CPT | Performed by: NURSE PRACTITIONER

## 2024-11-21 PROCEDURE — 3078F DIAST BP <80 MM HG: CPT | Performed by: NURSE PRACTITIONER

## 2024-11-21 PROCEDURE — 99214 OFFICE O/P EST MOD 30 MIN: CPT | Performed by: NURSE PRACTITIONER

## 2024-11-21 PROCEDURE — 83036 HEMOGLOBIN GLYCOSYLATED A1C: CPT | Performed by: NURSE PRACTITIONER

## 2024-11-21 PROCEDURE — G0463 HOSPITAL OUTPT CLINIC VISIT: HCPCS | Performed by: NURSE PRACTITIONER

## 2024-11-21 PROCEDURE — 3044F HG A1C LEVEL LT 7.0%: CPT | Performed by: NURSE PRACTITIONER

## 2024-11-21 PROCEDURE — 82948 REAGENT STRIP/BLOOD GLUCOSE: CPT | Performed by: NURSE PRACTITIONER
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
## 2024-11-26 ENCOUNTER — HOSPITAL ENCOUNTER (OUTPATIENT)
Dept: CT IMAGING | Facility: HOSPITAL | Age: 69
Discharge: HOME | End: 2024-11-26
Admitting: SURGERY
Payer: MEDICARE

## 2024-11-26 DIAGNOSIS — K62.5 BRBPR (BRIGHT RED BLOOD PER RECTUM): ICD-10-CM

## 2024-11-26 LAB
CREAT BLDA-MCNC: 0.5 MG/DL (ref 0.6–1.3)
EGFRCR SERPLBLD CKD-EPI 2021: 100.4 ML/MIN/1.73

## 2024-11-26 PROCEDURE — 82565 ASSAY OF CREATININE: CPT

## 2024-11-26 PROCEDURE — 25510000001 IOPAMIDOL PER 1 ML: Performed by: SURGERY

## 2024-11-26 PROCEDURE — 74177 CT ABD & PELVIS W/CONTRAST: CPT

## 2024-11-26 RX ORDER — IOPAMIDOL 755 MG/ML
100 INJECTION, SOLUTION INTRAVASCULAR
Status: COMPLETED | OUTPATIENT
Start: 2024-11-26 | End: 2024-11-26

## 2024-11-26 RX ADMIN — IOPAMIDOL 100 ML: 755 INJECTION, SOLUTION INTRAVENOUS at 14:39

## 2024-12-09 ENCOUNTER — TELEPHONE (OUTPATIENT)
Dept: UROLOGY | Age: 69
End: 2024-12-09
Payer: MEDICARE

## 2024-12-09 NOTE — TELEPHONE ENCOUNTER
Patient called, she stated that she had an MRI but she had not heard anything back about the results. Please call patient and talk to her

## 2024-12-11 ENCOUNTER — PREP FOR SURGERY (OUTPATIENT)
Dept: OTHER | Facility: HOSPITAL | Age: 69
End: 2024-12-11
Payer: MEDICARE

## 2024-12-11 DIAGNOSIS — K63.89 COLONIC MASS: Primary | ICD-10-CM

## 2024-12-11 DIAGNOSIS — R79.9 ABNORMAL FINDING OF BLOOD CHEMISTRY, UNSPECIFIED: ICD-10-CM

## 2024-12-11 RX ORDER — ERYTHROMYCIN 500 MG/1
1000 TABLET, COATED ORAL 3 TIMES DAILY
Qty: 6 TABLET | Refills: 0 | Status: SHIPPED | OUTPATIENT
Start: 2024-12-11 | End: 2024-12-12

## 2024-12-11 RX ORDER — ACETAMINOPHEN 500 MG
1000 TABLET ORAL ONCE
OUTPATIENT
Start: 2024-12-11 | End: 2024-12-11

## 2024-12-11 RX ORDER — NEOMYCIN SULFATE 500 MG/1
1000 TABLET ORAL 3 TIMES DAILY
Qty: 6 TABLET | Refills: 0 | Status: SHIPPED | OUTPATIENT
Start: 2024-12-11 | End: 2024-12-12

## 2024-12-11 RX ORDER — SCOLOPAMINE TRANSDERMAL SYSTEM 1 MG/1
1 PATCH, EXTENDED RELEASE TRANSDERMAL CONTINUOUS
OUTPATIENT
Start: 2024-12-11 | End: 2024-12-14

## 2024-12-11 RX ORDER — METRONIDAZOLE 500 MG/100ML
500 INJECTION, SOLUTION INTRAVENOUS ONCE
OUTPATIENT
Start: 2024-12-11 | End: 2024-12-11

## 2024-12-11 RX ORDER — PREGABALIN 75 MG/1
75 CAPSULE ORAL ONCE
OUTPATIENT
Start: 2024-12-11 | End: 2024-12-11

## 2024-12-11 RX ORDER — METRONIDAZOLE 500 MG/1
500 TABLET ORAL
OUTPATIENT
Start: 2024-12-12 | End: 2024-12-13

## 2024-12-11 RX ORDER — MELOXICAM 7.5 MG/1
15 TABLET ORAL ONCE
OUTPATIENT
Start: 2024-12-11 | End: 2024-12-11

## 2024-12-11 RX ORDER — CHLORHEXIDINE GLUCONATE 40 MG/ML
1 SOLUTION TOPICAL 2 TIMES DAILY
Qty: 236 ML | Refills: 0 | Status: SHIPPED | OUTPATIENT
Start: 2024-12-11

## 2024-12-17 ENCOUNTER — OFFICE VISIT (OUTPATIENT)
Dept: SURGERY | Facility: CLINIC | Age: 69
End: 2024-12-17
Payer: MEDICARE

## 2024-12-17 VITALS
WEIGHT: 275 LBS | BODY MASS INDEX: 48.73 KG/M2 | HEIGHT: 63 IN | SYSTOLIC BLOOD PRESSURE: 144 MMHG | DIASTOLIC BLOOD PRESSURE: 80 MMHG | HEART RATE: 63 BPM

## 2024-12-17 DIAGNOSIS — K63.89 MASS OF COLON: Primary | ICD-10-CM

## 2024-12-17 PROCEDURE — 99024 POSTOP FOLLOW-UP VISIT: CPT | Performed by: SURGERY
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
## 2025-01-13 ENCOUNTER — TELEPHONE (OUTPATIENT)
Dept: SURGERY | Facility: CLINIC | Age: 70
End: 2025-01-13
Payer: MEDICARE

## 2025-01-13 ENCOUNTER — PRE-ADMISSION TESTING (OUTPATIENT)
Dept: PREADMISSION TESTING | Facility: HOSPITAL | Age: 70
End: 2025-01-13
Payer: MEDICARE

## 2025-01-13 VITALS
WEIGHT: 272.93 LBS | OXYGEN SATURATION: 97 % | RESPIRATION RATE: 20 BRPM | HEART RATE: 67 BPM | SYSTOLIC BLOOD PRESSURE: 121 MMHG | TEMPERATURE: 98.3 F | HEIGHT: 64 IN | DIASTOLIC BLOOD PRESSURE: 80 MMHG | BODY MASS INDEX: 46.6 KG/M2

## 2025-01-13 DIAGNOSIS — R79.9 ABNORMAL FINDING OF BLOOD CHEMISTRY, UNSPECIFIED: ICD-10-CM

## 2025-01-13 DIAGNOSIS — Z01.818 ENCOUNTER FOR PREADMISSION TESTING: ICD-10-CM

## 2025-01-13 DIAGNOSIS — K63.89 COLONIC MASS: Primary | ICD-10-CM

## 2025-01-13 LAB
ABO GROUP BLD: NORMAL
ALBUMIN SERPL-MCNC: 3.9 G/DL (ref 3.5–5.2)
ALBUMIN/GLOB SERPL: 1.1 G/DL
ALP SERPL-CCNC: 79 U/L (ref 39–117)
ALT SERPL W P-5'-P-CCNC: 24 U/L (ref 1–33)
ANION GAP SERPL CALCULATED.3IONS-SCNC: 9.6 MMOL/L (ref 5–15)
AST SERPL-CCNC: 19 U/L (ref 1–32)
BASOPHILS # BLD AUTO: 0.04 10*3/MM3 (ref 0–0.2)
BASOPHILS NFR BLD AUTO: 0.6 % (ref 0–1.5)
BILIRUB SERPL-MCNC: 0.5 MG/DL (ref 0–1.2)
BUN SERPL-MCNC: 20 MG/DL (ref 8–23)
BUN/CREAT SERPL: 48.8 (ref 7–25)
CALCIUM SPEC-SCNC: 9.4 MG/DL (ref 8.6–10.5)
CHLORIDE SERPL-SCNC: 101 MMOL/L (ref 98–107)
CO2 SERPL-SCNC: 27.4 MMOL/L (ref 22–29)
CREAT SERPL-MCNC: 0.41 MG/DL (ref 0.57–1)
DEPRECATED RDW RBC AUTO: 46.6 FL (ref 37–54)
EGFRCR SERPLBLD CKD-EPI 2021: 105.3 ML/MIN/1.73
EOSINOPHIL # BLD AUTO: 0.11 10*3/MM3 (ref 0–0.4)
EOSINOPHIL NFR BLD AUTO: 1.7 % (ref 0.3–6.2)
ERYTHROCYTE [DISTWIDTH] IN BLOOD BY AUTOMATED COUNT: 13.2 % (ref 12.3–15.4)
GLOBULIN UR ELPH-MCNC: 3.7 GM/DL
GLUCOSE SERPL-MCNC: 84 MG/DL (ref 65–99)
HBA1C MFR BLD: 5.5 % (ref 4.8–5.6)
HCT VFR BLD AUTO: 42.7 % (ref 34–46.6)
HGB BLD-MCNC: 13.3 G/DL (ref 12–15.9)
IMM GRANULOCYTES # BLD AUTO: 0.02 10*3/MM3 (ref 0–0.05)
IMM GRANULOCYTES NFR BLD AUTO: 0.3 % (ref 0–0.5)
LYMPHOCYTES # BLD AUTO: 2.4 10*3/MM3 (ref 0.7–3.1)
LYMPHOCYTES NFR BLD AUTO: 36 % (ref 19.6–45.3)
MCH RBC QN AUTO: 29.7 PG (ref 26.6–33)
MCHC RBC AUTO-ENTMCNC: 31.1 G/DL (ref 31.5–35.7)
MCV RBC AUTO: 95.3 FL (ref 79–97)
MONOCYTES # BLD AUTO: 0.72 10*3/MM3 (ref 0.1–0.9)
MONOCYTES NFR BLD AUTO: 10.8 % (ref 5–12)
NEUTROPHILS NFR BLD AUTO: 3.37 10*3/MM3 (ref 1.7–7)
NEUTROPHILS NFR BLD AUTO: 50.6 % (ref 42.7–76)
NRBC BLD AUTO-RTO: 0 /100 WBC (ref 0–0.2)
PLATELET # BLD AUTO: 298 10*3/MM3 (ref 140–450)
PMV BLD AUTO: 10.4 FL (ref 6–12)
POTASSIUM SERPL-SCNC: 3.7 MMOL/L (ref 3.5–5.2)
PROT SERPL-MCNC: 7.6 G/DL (ref 6–8.5)
QT INTERVAL: 428 MS
QTC INTERVAL: 443 MS
RBC # BLD AUTO: 4.48 10*6/MM3 (ref 3.77–5.28)
RH BLD: NEGATIVE
SODIUM SERPL-SCNC: 138 MMOL/L (ref 136–145)
WBC NRBC COR # BLD AUTO: 6.66 10*3/MM3 (ref 3.4–10.8)

## 2025-01-13 PROCEDURE — 36415 COLL VENOUS BLD VENIPUNCTURE: CPT

## 2025-01-13 PROCEDURE — 85025 COMPLETE CBC W/AUTO DIFF WBC: CPT

## 2025-01-13 PROCEDURE — 86900 BLOOD TYPING SEROLOGIC ABO: CPT

## 2025-01-13 PROCEDURE — 93005 ELECTROCARDIOGRAM TRACING: CPT

## 2025-01-13 PROCEDURE — 86901 BLOOD TYPING SEROLOGIC RH(D): CPT

## 2025-01-13 PROCEDURE — 83036 HEMOGLOBIN GLYCOSYLATED A1C: CPT

## 2025-01-13 PROCEDURE — 80053 COMPREHEN METABOLIC PANEL: CPT

## 2025-01-13 NOTE — SIGNIFICANT NOTE
Colon book given to pt and reviewed with pt.         Pt has bowel prep and antibiotic at home.  The pt  had no questions regarding bowel prep. Instructed pt to use CHG soap the night before and the morning of surgery.  Discussed anesthesia and they will have a visit the morning prior to surgeryto answer any questions.  Pt given G2 to drinkthree times the day before surgery and 2 hours prior to surgery. Discussed gum chewing to start in recovery to promote bowel motility.  Discussed post op expectationson Day 1,2 and three as outlined in colon book. Pt given phone number to call if any questions arise after visit.  Pt verbalized understanding of all instructions.    [[email or web address]:[email or web address]]

## 2025-01-13 NOTE — TELEPHONE ENCOUNTER
SHARON CALLED FROM [facility].  THE PATIENT IS THERE FOR A VISIT.    SHE IS SCHEDULED FOR A COLON RESECTION, AND SHE WANTS TO KNOW IF SHE NEEDS A BOWEL PREP, AND WHAT IT IS.    THE PATIENT SAID SHE DID A BOTTLE OF MIRALAX AND DULCOLAX FOR A COLONOSCOPY.    CB#[id number]

## 2025-01-13 NOTE — DISCHARGE INSTRUCTIONS
IMPORTANT INSTRUCTIONS - PRE-ADMISSION TESTING  DO NOT EAT OR CHEW anything after midnight the night before your procedure.    You may have CLEAR liquids up to ___2___ hours prior to ARRIVAL time.   Take the following medications the morning of your procedure with JUST A SIP OF WATER:  __PROZAC     HOLD LOSARTAN-HYDROCHLOROTHIAZIDE AM OF SURGERY     DO NOT BRING your medications to the hospital with you, UNLESS something has changed since your PRE-Admission Testing appointment.  Hold all vitamins, supplements, and NSAIDS (Non- steroidal anti-inflammatory meds) for one week prior to surgery (you MAY take Tylenol or Acetaminophen).  If you are diabetic, check your blood sugar the morning of your procedure. If it is less than 70 or if you are feeling symptomatic, call the following number for further instructions: [phone].  Bring your CPAP or BIPAP to hospital, ONLY IF YOU WILL BE SPENDING THE NIGHT.   Make sure you have a ride home and have someone who will stay with you the day of your procedure after you go home.  If you have any questions, please call your Pre-Admission Testing Nurse, _SAVANNAH VILLATORO RN  at [phone].   Per anesthesia request, do not smoke for 24 hours before your procedure or as instructed by your surgeon.    Clear Liquid Diet        Find out when you need to start a clear liquid diet.   Think of “clear liquids” as anything you could read a newspaper through. This includes things like water, broth, sports drinks, or tea WITHOUT any kind of milk or cream.           Once you are told to start a clear liquid diet, only drink these things until 2 hours before arrival to the hospital or when the hospital says to stop. Total volume limitation: 8 oz.       Clear liquids you CAN drink:   Water   Clear broth: beef, chicken, vegetable, or bone broth with nothing in it   Gatorade   Lemonade or Dylan-aid   Soda   Tea, coffee (NO cream or honey)   Jell-O (without fruit)   Popsicles (without fruit or cream)    Italian ices   Juice without pulp: apple, white, grape   You may use salt, pepper, and sugar  NO RED LIQUID   DRINK GATORADE 0 MORNING NOON AND EVENING THE DAY BEFORE SURGERY AND THE MORNING OF SURGERY 2 HOURS PRIOR TO SURGERY.     Do NOT drink:   Milk or cream   Soy milk, almond milk, coconut milk, or other non-dairy drinks and   creamers   Milkshakes or smoothies   Tomato juice   Orange juice   Grapefruit juice   Cream soups or any other than broth         Clear Liquid Diet:  Do NOT eat any solid food.  Do NOT eat or suck on mints or candy.  Do NOT chew gum.  Do NOT drink thick liquids like milk or juice with pulp in it.  Do NOT add milk, cream, or anything like soy milk or almond milk to coffee or tea.     PREOPERATIVE (BEFORE SURGERY)              BATHING INSTRUCTIONS  Instructions:    You will need to shower 2 separate times utilizing the soap provided; at the times indicated   below:     THE NIGHT BEFORE SURGERY AND THE MORNING OF SURGERY      Wash your hair and face with normal shampoo and soap, rinse it well before using the surgical soap.      In the shower, wet the skin completely with water from your neck to your feet. Apply the cleanser to your   body ONLY FROM THE NECK TO YOUR FEET.     Do NOT USE THE CLEANSER ON YOUR FACE, HEAD, OR GENITAL (PRIVATE) AREAS.   Keep it out of your eyes, ears, and mouth because of the risk of injury to those areas.      Scrub with a clean washcloth for each bath utilizing the soap provided from the top of your body to the   bottom starting at the neck area.      Pay close attention to your armpits, groin area, and the site of surgery.      Wash your body gently for 5 minutes. Stand outside the stream or turn off the water while scrubbing your   body. Do NOT wash with your regular soap after the surgical cleanser is used.      RINSE THE CLEANSER OFF COMPLETELY with plenty of water. Rinse the area again thoroughly.      Dry off with a clean towel. The surgical soap can  cause dryness; however do NOT APPLY LOTION,   CREAM, POWDER, and/or DEODORANT AFTER SHOWERING.     Be sure to where clean clothes after showering.      Ensure CLEAN BED LINENS AFTER FIRST wash with the surgical soap.      NO PETS ALLOWED IN THE BED with you after utilizing the surgical soap.

## 2025-01-13 NOTE — TELEPHONE ENCOUNTER
SPOKE TO SAVANNAH AND LET HER KNOW THAT WE GAVE THE PATIENT HER PREP SHEET WHEN SHE WAS HER IN THE OFFICE FOR HER APPOINTMENT. ALSO THAT DR PENDLETON CALLED HER ANTIBIOTICS IN TO HER PHARMACY. EXPLAINED TO SAVANNAH THAT THE PATIENT CAN GET THE PREP ALL OVER THE COUNTER.

## 2025-01-14 ENCOUNTER — TELEPHONE (OUTPATIENT)
Dept: SURGERY | Facility: CLINIC | Age: 70
End: 2025-01-14
Payer: MEDICARE

## 2025-01-14 RX ORDER — ERYTHROMYCIN 500 MG/1
500 TABLET, DELAYED RELEASE ORAL TAKE AS DIRECTED
COMMUNITY
End: 2025-02-19 | Stop reason: HOSPADM

## 2025-01-14 RX ORDER — NEOMYCIN SULFATE 500 MG/1
1000 TABLET ORAL TAKE AS DIRECTED
COMMUNITY
End: 2025-02-19 | Stop reason: HOSPADM

## 2025-01-14 NOTE — PAT
Per Dr. Gallagher patient needs cardiac clearance prior to surgery due to abnormal EKG. Monica at Dr. Beckford office made aware. Called and spoke with patient about need for clearance.  Called [facility] per office will call patient to make appointment.  Will call patient back once pt scheduled for appointment.

## 2025-01-14 NOTE — TELEPHONE ENCOUNTER
PER MARILY IN PAT PATIENT NEEDS CARDIAC CLEARANCE PER DR GOETZ BEFORE PATIENT CAN HAVE SURGERY. MARILY IS GOING TO TRY AND GET THE PATIENT IN WITH DR BARBER, AND SHE WILL CALL THE PATIENT WITH THE APPOINTMENT. PATIENT NOTIFIED SURGERY CXD UNTIL PATIENT SEEN BY CARDIOLOGY AND CLEARANCE OBTAINED. PATIENT STATES UNDERSTANDING.

## 2025-01-14 NOTE — TELEPHONE ENCOUNTER
SPOKE WITH THE PATIENT AND LET HER KNOW SHE COULD RESUME HER MEDICATIONS UNTIL WE GET HER R/S'D FOR HER SURGERY.

## 2025-01-14 NOTE — TELEPHONE ENCOUNTER
PATIENT CALLED AND SAID HER SURGERY WAS CANCELED FOR MONDAY, DUE TO SOME TESTING SHE DID WITH SAVANNAH AT [facility].    SHE WANTS TO TALK TO SAVANNAH OR MARILY.  SHE SAID SHE HAS SOME GENERAL QUESTIONS ABOUT MEDICATIONS THAT WERE PUT ON HOLD.  SHE SAID THEY PUT A HOLD ON VITAMIN SUPPLEMENT, AND SHE SAID SHE IS ON MONJARO.    #[phone]

## 2025-01-15 ENCOUNTER — TELEPHONE (OUTPATIENT)
Dept: SURGERY | Facility: CLINIC | Age: 70
End: 2025-01-15
Payer: MEDICARE

## 2025-01-15 NOTE — TELEPHONE ENCOUNTER
SPOKE TO THE PATIENT AND TOLD HER SHE WOULD NEED TO CONTACT DR BARBER'S OFFICE SINCE THEY ARE THE ONES WHO ORDERED THE TEST. PATIENT STATES UNDERSTANDING.

## 2025-01-15 NOTE — TELEPHONE ENCOUNTER
PATIENT CALLED AND SAID SHE IS SCHEDULED FOR TESTING IN [place] BY DR. BARBER ON FRIDAY.  SHE SAID SHE SPOKE TO MARILY, AND WE GOT HER IN AFTER SHE WAS THERE MONDAY, SO THE APPOINTMENT FOR THE TEST WAS MADE QUICKLY.    SHE SAID HER INSURANCE NEEDS AN EXPEDITED PRE-AUTHORIZATION FOR THE TESTING.  SHE ASSUMES DR. GRACE ORDERED THE TEST.    SHE SAID DR. BARBER'S OFFICE IS NEW TO HER, AND SHE CAN'T GET IN TOUCH WITH ANYONE THERE.    #[phone]

## 2025-01-17 ENCOUNTER — OFFICE VISIT (OUTPATIENT)
Dept: CARDIOLOGY | Facility: CLINIC | Age: 70
End: 2025-01-17
Payer: MEDICARE

## 2025-01-17 VITALS
WEIGHT: 275 LBS | BODY MASS INDEX: 46.95 KG/M2 | SYSTOLIC BLOOD PRESSURE: 138 MMHG | DIASTOLIC BLOOD PRESSURE: 82 MMHG | HEIGHT: 64 IN | HEART RATE: 79 BPM

## 2025-01-17 DIAGNOSIS — R06.09 DOE (DYSPNEA ON EXERTION): ICD-10-CM

## 2025-01-17 DIAGNOSIS — I10 PRIMARY HYPERTENSION: ICD-10-CM

## 2025-01-17 DIAGNOSIS — R94.31 ABNORMAL EKG: Primary | ICD-10-CM

## 2025-01-17 PROCEDURE — 99204 OFFICE O/P NEW MOD 45 MIN: CPT | Performed by: INTERNAL MEDICINE

## 2025-01-17 PROCEDURE — 3079F DIAST BP 80-89 MM HG: CPT | Performed by: INTERNAL MEDICINE

## 2025-01-17 PROCEDURE — 3075F SYST BP GE 130 - 139MM HG: CPT | Performed by: INTERNAL MEDICINE

## 2025-01-17 NOTE — TELEPHONE ENCOUNTER
PATIENT IS AWARE AND THAT SURGERY HAS BEEN CXD. SHE WILL CALL US BACK ONCE SHE IS SEEN BY CARIOLOGY SO THAT WE CAN SEND FOR CLEARANCE.

## 2025-01-17 NOTE — ASSESSMENT & PLAN NOTE
Patient with abnormal EKG suggesting possible anterior inferior wall MI's no previous symptoms of overt angina does have limited exercise capacity EKG changes could be artifactual in nature but given risk factors and poor mobility issues recommended out of a stress s test and echocardiogram for further evaluation

## 2025-01-17 NOTE — PROGRESS NOTES
Chief Complaint  Abnormal ECG (NEED CLEARED FOR SURGERY) and Hypertension      History of Present Illness  Bailey Reese presents to [facility] CARDIOLOGY  Patient is a 69-year-old female with diabetes and hypertension who is undergoing workup for colonic mass and need for surgery EKG revealed evidence of possible infra wall and anterior wall MI.  She reports no chest pain or symptomatic change in her breathing ability.  She does have somewhat limited ambulatory ability due to muscle skeletal issues though and some ongoing dyspnea on exertion.  She has not had PND orthopnea lower extremity edema.    Past Medical History:   Diagnosis Date    Allergic rhinitis     Arthritis     Concussion     Depression     Diabetes mellitus     High blood pressure     Limb pain     Limb swelling     Neuropathy     Sleep apnea     cpap         Current Outpatient Medications:     Chlorhexidine Gluconate 4 % solution, Apply 1 Application topically to the appropriate area as directed 2 (Two) Times a Day. Shower With Hibiclens Solution Twice The Day Before Surgery, Disp: 236 mL, Rfl: 0    erythromycin (LEE-TAB) 500 MG EC tablet, Take 1 tablet by mouth Take As Directed. PRE OP MEDICATION, Disp: , Rfl:     FLUoxetine (PROzac) 10 MG capsule, Take 1 capsule by mouth Daily., Disp: , Rfl:     furosemide (LASIX) 20 MG tablet, Take 1 tablet by mouth Daily., Disp: , Rfl:     losartan-hydrochlorothiazide (HYZAAR) 100-25 MG per tablet, Hyzaar 100-25 mg oral tablet take 1 tablet by oral route once daily   Active, Disp: , Rfl:     Misc Natural Products (OSTEO BI-FLEX ADV DOUBLE ST PO), Take  by mouth., Disp: , Rfl:     multivitamin with minerals (MULTIVITAMIN ADULT PO), Take 1 tablet by mouth Daily., Disp: , Rfl:     neomycin (MYCIFRADIN) 500 MG tablet, Take 2 tablets by mouth Take As Directed. PRE OP MEDICATIONS, Disp: , Rfl:     Potassium 99 MG tablet, Take  by mouth., Disp: , Rfl:     pregabalin (LYRICA) 100 MG capsule, Take 1  "capsule by mouth Every 12 (Twelve) Hours., Disp: , Rfl:     Tirzepatide (Mounjaro) 10 MG/0.5ML solution auto-injector, Inject 10 mg under the skin into the appropriate area as directed 1 (One) Time Per Week., Disp: 6 mL, Rfl: 1    There are no discontinued medications.  Allergies   Allergen Reactions    Adhesive Tape Rash        Social History     Tobacco Use    Smoking status: Never     Passive exposure: Never    Smokeless tobacco: Never   Vaping Use    Vaping status: Never Used   Substance Use Topics    Alcohol use: Not Currently     Comment: WAS OCCASIONALLY    Drug use: Never       Family History   Problem Relation Age of Onset    Diabetes Mother     Heart attack Mother     No Known Problems Father     Diabetes Maternal Grandmother     Malig Hyperthermia Neg Hx         Objective     /82   Pulse 79   Ht 162.6 cm (64\")   Wt 125 kg (275 lb)   BMI 47.20 kg/m²       Physical Exam    General Appearance:   no acute distress  Alert and oriented x3  HENT:   lips not cyanotic  Atraumatic  Neck:  No jvd   supple  Respiratory:  no respiratory distress  normal breath sounds  no rales  Cardiovascular:  Regular rate and rhythm  no S3, no S4   no murmur  no rub  Extremities  No cyanosis  lower extremity edema: Mild skin:   warm, dry  No rashes    Result Review :     No results found for: \"PROBNP\"  CMP          11/26/2024    14:40 1/13/2025    14:17   CMP   Glucose  84    BUN  20    Creatinine 0.50  0.41    EGFR 100.4  105.3    Sodium  138    Potassium  3.7    Chloride  101    Calcium  9.4    Total Protein  7.6    Albumin  3.9    Globulin  3.7    Total Bilirubin  0.5    Alkaline Phosphatase  79    AST (SGOT)  19    ALT (SGPT)  24    Albumin/Globulin Ratio  1.1    BUN/Creatinine Ratio  48.8    Anion Gap  9.6      CBC w/diff          1/13/2025    14:17   CBC w/Diff   WBC 6.66    RBC 4.48    Hemoglobin 13.3    Hematocrit 42.7    MCV 95.3    MCH 29.7    MCHC 31.1    RDW 13.2    Platelets 298    Neutrophil Rel % 50.6  " "  Immature Granulocyte Rel % 0.3    Lymphocyte Rel % 36.0    Monocyte Rel % 10.8    Eosinophil Rel % 1.7    Basophil Rel % 0.6       No results found for: \"TSH\"   No results found for: \"FREET4\"   No results found for: \"DDIMERQUANT\"  Magnesium   Date Value Ref Range Status   02/18/2018 1.8 1.6 - 2.3 mg/dL Final      No results found for: \"DIGOXIN\"   No results found for: \"TROPONINT\"   No results found for: \"POCTROP\"(      Lab 01/13/25  1417   HEMOGLOBIN A1C 5.50               No results found for this or any previous visit.             The ASCVD Risk score (Juju RIZVI, et al., 2019) failed to calculate for the following reasons:    Cannot find a previous HDL lab    Cannot find a previous total cholesterol lab     Diagnoses and all orders for this visit:    1. Abnormal EKG (Primary)  Assessment & Plan:  Patient with abnormal EKG suggesting possible anterior inferior wall MI's no previous symptoms of overt angina does have limited exercise capacity EKG changes could be artifactual in nature but given risk factors and poor mobility issues recommended out of a stress s test and echocardiogram for further evaluation    Orders:  -     Stress Test With Myocardial Perfusion Two Day; Future  -     Cancel: Adult Transthoracic Echo Complete W/ Cont if Necessary Per Protocol; Future  -     Adult Transthoracic Echo Complete W/ Cont if Necessary Per Protocol; Future    2. BRAY (dyspnea on exertion)  -     Stress Test With Myocardial Perfusion Two Day; Future  -     Cancel: Adult Transthoracic Echo Complete W/ Cont if Necessary Per Protocol; Future  -     Adult Transthoracic Echo Complete W/ Cont if Necessary Per Protocol; Future    3. Primary hypertension            Follow Up     No follow-ups on file.          Patient was given instructions and counseling regarding her condition or for health maintenance advice. Please see specific information pulled into the AVS if appropriate.       "

## 2025-01-21 LAB
QT INTERVAL: 428 MS
QTC INTERVAL: 443 MS

## 2025-01-23 ENCOUNTER — HOSPITAL ENCOUNTER (OUTPATIENT)
Dept: NUCLEAR MEDICINE | Facility: HOSPITAL | Age: 70
Discharge: HOME | End: 2025-01-23
Payer: MEDICARE

## 2025-01-23 DIAGNOSIS — R94.31 ABNORMAL EKG: ICD-10-CM

## 2025-01-23 DIAGNOSIS — R06.09 DOE (DYSPNEA ON EXERTION): ICD-10-CM

## 2025-01-23 PROCEDURE — 93017 CV STRESS TEST TRACING ONLY: CPT

## 2025-01-23 PROCEDURE — A9502 TC99M TETROFOSMIN: HCPCS | Performed by: INTERNAL MEDICINE

## 2025-01-23 PROCEDURE — 78452 HT MUSCLE IMAGE SPECT MULT: CPT

## 2025-01-23 PROCEDURE — 34310000005 TECHNETIUM TETROFOSMIN KIT: Performed by: INTERNAL MEDICINE

## 2025-01-23 PROCEDURE — 25010000002 REGADENOSON 0.4 MG/5ML SOLUTION: Performed by: INTERNAL MEDICINE

## 2025-01-23 RX ORDER — REGADENOSON 0.08 MG/ML
0.4 INJECTION, SOLUTION INTRAVENOUS
Status: COMPLETED | OUTPATIENT
Start: 2025-01-23 | End: 2025-01-23

## 2025-01-23 RX ADMIN — TETROFOSMIN 1 DOSE: 1.38 INJECTION, POWDER, LYOPHILIZED, FOR SOLUTION INTRAVENOUS at 08:54

## 2025-01-23 RX ADMIN — REGADENOSON 0.4 MG: 0.08 INJECTION, SOLUTION INTRAVENOUS at 08:54

## 2025-01-24 ENCOUNTER — HOSPITAL ENCOUNTER (OUTPATIENT)
Dept: NUCLEAR MEDICINE | Facility: HOSPITAL | Age: 70
Discharge: HOME | End: 2025-01-24
Payer: MEDICARE

## 2025-01-24 ENCOUNTER — TELEPHONE (OUTPATIENT)
Dept: SURGERY | Facility: CLINIC | Age: 70
End: 2025-01-24
Payer: MEDICARE

## 2025-01-24 ENCOUNTER — HOSPITAL ENCOUNTER (OUTPATIENT)
Facility: HOSPITAL | Age: 70
Discharge: HOME | End: 2025-01-24
Payer: MEDICARE

## 2025-01-24 DIAGNOSIS — R94.31 ABNORMAL EKG: ICD-10-CM

## 2025-01-24 DIAGNOSIS — R06.09 DOE (DYSPNEA ON EXERTION): ICD-10-CM

## 2025-01-24 PROCEDURE — A9502 TC99M TETROFOSMIN: HCPCS | Performed by: INTERNAL MEDICINE

## 2025-01-24 PROCEDURE — 25010000002 SULFUR HEXAFLUORIDE MICROSPH 60.7-25 MG RECONSTITUTED SUSPENSION: Performed by: INTERNAL MEDICINE

## 2025-01-24 PROCEDURE — 93306 TTE W/DOPPLER COMPLETE: CPT

## 2025-01-24 PROCEDURE — 34310000005 TECHNETIUM TETROFOSMIN KIT: Performed by: INTERNAL MEDICINE

## 2025-01-24 RX ADMIN — TETROFOSMIN 1 DOSE: 1.38 INJECTION, POWDER, LYOPHILIZED, FOR SOLUTION INTRAVENOUS at 08:48

## 2025-01-24 RX ADMIN — SULFUR HEXAFLUORIDE 5 ML: KIT at 10:56

## 2025-01-24 NOTE — TELEPHONE ENCOUNTER
SPOKE TO THE PATIENT AND LET HER KNOW THAT SHE WILL HAVE TO FOLLOW UP WITH DR BARBER ABOUT HER TEST AND THEN WE WILL HAVE TO GET CARDIAC CLEARANCE BEFORE WE CAN R/S HER SURGERY.

## 2025-01-24 NOTE — TELEPHONE ENCOUNTER
Patient stated surgery was canceled due to pre op testing. She is wondering if sx can be rescheduled. Please contact patient to advise.

## 2025-01-27 ENCOUNTER — TELEPHONE (OUTPATIENT)
Dept: CARDIOLOGY | Facility: CLINIC | Age: 70
End: 2025-01-27
Payer: MEDICARE

## 2025-01-27 LAB
ASCENDING AORTA: 3.2 CM
AV MEAN PRESS GRAD SYS DOP V1V2: 3.3 MMHG
AV VMAX SYS DOP: 122 CM/SEC
BH CV ECHO MEAS - AO MAX PG: 6 MMHG
BH CV ECHO MEAS - AO V2 VTI: 23.2 CM
BH CV ECHO MEAS - EDV(MOD-SP2): 106 ML
BH CV ECHO MEAS - EDV(MOD-SP4): 91.7 ML
BH CV ECHO MEAS - EF(MOD-SP2): 66.1 %
BH CV ECHO MEAS - EF(MOD-SP4): 67 %
BH CV ECHO MEAS - ESV(MOD-SP2): 35.9 ML
BH CV ECHO MEAS - ESV(MOD-SP4): 30.3 ML
BH CV ECHO MEAS - IVS/LVPW: 1 CM
BH CV ECHO MEAS - IVSD: 0.8 CM
BH CV ECHO MEAS - LA DIMENSION: 3.9 CM
BH CV ECHO MEAS - LAT PEAK E' VEL: 9 CM/SEC
BH CV ECHO MEAS - LV DIASTOLIC VOL/BSA (35-75): 40.9 CM2
BH CV ECHO MEAS - LV MAX PG: 4 MMHG
BH CV ECHO MEAS - LV MEAN PG: 1.5 MMHG
BH CV ECHO MEAS - LV SYSTOLIC VOL/BSA (12-30): 13.5 CM2
BH CV ECHO MEAS - LV V1 MAX: 100 CM/SEC
BH CV ECHO MEAS - LV V1 VTI: 17.1 CM
BH CV ECHO MEAS - LVIDD: 4.4 CM
BH CV ECHO MEAS - LVIDS: 2.7 CM
BH CV ECHO MEAS - LVOT DIAM: 2 CM
BH CV ECHO MEAS - LVPWD: 0.8 CM
BH CV ECHO MEAS - MED PEAK E' VEL: 7.2 CM/SEC
BH CV ECHO MEAS - MV A MAX VEL: 95.5 CM/SEC
BH CV ECHO MEAS - MV E MAX VEL: 79.8 CM/SEC
BH CV ECHO MEAS - MV E/A: 0.84
BH CV ECHO MEAS - SV(MOD-SP2): 70.1 ML
BH CV ECHO MEAS - SV(MOD-SP4): 61.4 ML
BH CV ECHO MEAS - SVI(MOD-SP2): 31.2 ML/M2
BH CV ECHO MEAS - SVI(MOD-SP4): 27.4 ML/M2
BH CV ECHO MEAS - TAPSE (>1.6): 2.45 CM
BH CV ECHO MEAS - TR MAX PG: 23.5 MMHG
BH CV ECHO MEAS - TR MAX VEL: 242.4 CM/SEC
BH CV ECHO MEASUREMENTS AVERAGE E/E' RATIO: 9.85
BH CV IMMEDIATE POST TECH DATA BLOOD PRESSURE: NORMAL MMHG
BH CV IMMEDIATE POST TECH DATA HEART RATE: 79 BPM
BH CV IMMEDIATE POST TECH DATA OXYGEN SATS: 98 %
BH CV REST NUCLEAR ISOTOPE DOSE: 37 MCI
BH CV SIX MINUTE RECOVERY TECH DATA BLOOD PRESSURE: NORMAL
BH CV SIX MINUTE RECOVERY TECH DATA HEART RATE: 76 BPM
BH CV SIX MINUTE RECOVERY TECH DATA OXYGEN SATURATION: 98 %
BH CV STRESS BP STAGE 1: NORMAL
BH CV STRESS COMMENTS STAGE 1: NORMAL
BH CV STRESS DOSE REGADENOSON STAGE 1: 0.4
BH CV STRESS DURATION MIN STAGE 1: 0
BH CV STRESS DURATION SEC STAGE 1: 10
BH CV STRESS HR STAGE 1: 81
BH CV STRESS NUCLEAR ISOTOPE DOSE: 35.7 MCI
BH CV STRESS O2 STAGE 1: 98
BH CV STRESS PROTOCOL 1: NORMAL
BH CV STRESS RECOVERY BP: NORMAL MMHG
BH CV STRESS RECOVERY HR: 76 BPM
BH CV STRESS RECOVERY O2: 98 %
BH CV STRESS STAGE 1: 1
BH CV THREE MINUTE POST TECH DATA BLOOD PRESSURE: NORMAL MMHG
BH CV THREE MINUTE POST TECH DATA HEART RATE: 74 BPM
BH CV THREE MINUTE POST TECH DATA OXYGEN SATURATION: 98 %
BH CV XLRA - RV BASE: 3.6 CM
BH CV XLRA - TDI S': 14.4 CM/SEC
LEFT ATRIUM VOLUME INDEX: 28.5 ML/M2
LV EF 2D ECHO EST: 55 %
MAXIMAL PREDICTED HEART RATE: 149 BPM
PERCENT MAX PREDICTED HR: 54.36 %
SINUS: 3 CM
SPECT HRT GATED+EF W RNC IV: 66 %
STRESS BASELINE BP: NORMAL MMHG
STRESS BASELINE HR: 68 BPM
STRESS O2 SAT REST: 98 %
STRESS PERCENT HR: 64 %
STRESS POST O2 SAT PEAK: 98 %
STRESS POST PEAK BP: NORMAL MMHG
STRESS POST PEAK HR: 81 BPM
STRESS TARGET HR: 127 BPM

## 2025-01-27 NOTE — TELEPHONE ENCOUNTER
Spoke with patient, patient is aware and verbalized understanding.  Transferred to PAC to schedule follow up.

## 2025-01-27 NOTE — TELEPHONE ENCOUNTER
----- Message from Nolvia Lau sent at 1/27/2025  1:24 PM EST -----  Echocardiogram shows normal heart muscle function and no significant valve disease noted. Notify office of any new/worsening cardiac symptoms

## 2025-01-27 NOTE — TELEPHONE ENCOUNTER
----- Message from Nolvia Lau sent at 1/27/2025  1:24 PM EST -----  Stress test shows no sign of blockage in coronary vessels. Follow up in six months. Notify office of any new/worsening cardiac symptoms

## 2025-01-27 NOTE — TELEPHONE ENCOUNTER
Procedure: Right Hemicolectomy     Med Directive: NA    PMH: abnormal EKG-normal stress test, HTN    Last Seen: 1/17/2025

## 2025-02-10 ENCOUNTER — PRE-ADMISSION TESTING (OUTPATIENT)
Dept: PREADMISSION TESTING | Facility: HOSPITAL | Age: 70
End: 2025-02-10
Payer: MEDICARE

## 2025-02-10 VITALS
SYSTOLIC BLOOD PRESSURE: 139 MMHG | HEART RATE: 64 BPM | HEIGHT: 64 IN | OXYGEN SATURATION: 100 % | TEMPERATURE: 97.8 F | RESPIRATION RATE: 16 BRPM | BODY MASS INDEX: 46.56 KG/M2 | DIASTOLIC BLOOD PRESSURE: 84 MMHG | WEIGHT: 272.71 LBS

## 2025-02-10 DIAGNOSIS — K63.89 COLONIC MASS: Primary | ICD-10-CM

## 2025-02-10 LAB
ALBUMIN SERPL-MCNC: 4.1 G/DL (ref 3.5–5.2)
ALBUMIN/GLOB SERPL: 1.3 G/DL
ALP SERPL-CCNC: 75 U/L (ref 39–117)
ALT SERPL W P-5'-P-CCNC: 21 U/L (ref 1–33)
ANION GAP SERPL CALCULATED.3IONS-SCNC: 9.2 MMOL/L (ref 5–15)
AST SERPL-CCNC: 19 U/L (ref 1–32)
BASOPHILS # BLD AUTO: 0.03 10*3/MM3 (ref 0–0.2)
BASOPHILS NFR BLD AUTO: 0.4 % (ref 0–1.5)
BILIRUB SERPL-MCNC: 0.4 MG/DL (ref 0–1.2)
BUN SERPL-MCNC: 17 MG/DL (ref 8–23)
BUN/CREAT SERPL: 35.4 (ref 7–25)
CALCIUM SPEC-SCNC: 9 MG/DL (ref 8.6–10.5)
CHLORIDE SERPL-SCNC: 100 MMOL/L (ref 98–107)
CO2 SERPL-SCNC: 26.8 MMOL/L (ref 22–29)
CREAT SERPL-MCNC: 0.48 MG/DL (ref 0.57–1)
DEPRECATED RDW RBC AUTO: 45.1 FL (ref 37–54)
EGFRCR SERPLBLD CKD-EPI 2021: 101.4 ML/MIN/1.73
EOSINOPHIL # BLD AUTO: 0.13 10*3/MM3 (ref 0–0.4)
EOSINOPHIL NFR BLD AUTO: 1.9 % (ref 0.3–6.2)
ERYTHROCYTE [DISTWIDTH] IN BLOOD BY AUTOMATED COUNT: 13.3 % (ref 12.3–15.4)
GLOBULIN UR ELPH-MCNC: 3.1 GM/DL
GLUCOSE SERPL-MCNC: 87 MG/DL (ref 65–99)
HCT VFR BLD AUTO: 39.9 % (ref 34–46.6)
HGB BLD-MCNC: 12.7 G/DL (ref 12–15.9)
IMM GRANULOCYTES # BLD AUTO: 0.02 10*3/MM3 (ref 0–0.05)
IMM GRANULOCYTES NFR BLD AUTO: 0.3 % (ref 0–0.5)
LYMPHOCYTES # BLD AUTO: 2.19 10*3/MM3 (ref 0.7–3.1)
LYMPHOCYTES NFR BLD AUTO: 32.3 % (ref 19.6–45.3)
MCH RBC QN AUTO: 29.9 PG (ref 26.6–33)
MCHC RBC AUTO-ENTMCNC: 31.8 G/DL (ref 31.5–35.7)
MCV RBC AUTO: 93.9 FL (ref 79–97)
MONOCYTES # BLD AUTO: 0.52 10*3/MM3 (ref 0.1–0.9)
MONOCYTES NFR BLD AUTO: 7.7 % (ref 5–12)
NEUTROPHILS NFR BLD AUTO: 3.88 10*3/MM3 (ref 1.7–7)
NEUTROPHILS NFR BLD AUTO: 57.4 % (ref 42.7–76)
NRBC BLD AUTO-RTO: 0 /100 WBC (ref 0–0.2)
PLATELET # BLD AUTO: 275 10*3/MM3 (ref 140–450)
PMV BLD AUTO: 9.7 FL (ref 6–12)
POTASSIUM SERPL-SCNC: 4 MMOL/L (ref 3.5–5.2)
PROT SERPL-MCNC: 7.2 G/DL (ref 6–8.5)
RBC # BLD AUTO: 4.25 10*6/MM3 (ref 3.77–5.28)
SODIUM SERPL-SCNC: 136 MMOL/L (ref 136–145)
WBC NRBC COR # BLD AUTO: 6.77 10*3/MM3 (ref 3.4–10.8)

## 2025-02-10 PROCEDURE — 36415 COLL VENOUS BLD VENIPUNCTURE: CPT

## 2025-02-10 PROCEDURE — 80053 COMPREHEN METABOLIC PANEL: CPT

## 2025-02-10 PROCEDURE — 85025 COMPLETE CBC W/AUTO DIFF WBC: CPT

## 2025-02-10 NOTE — SIGNIFICANT NOTE
EDUCATIONAL MATERIALS UPDATED, SURGICAL SOAP AND GATORADE ERAS SUPPLEMENT GIVEN. PT VERBALIZED UNDERSTANDING OF PREP AND CLEAR LIQUID DIET. DIET INSTRUCTIONS AND PREOP INSTRUCTIONS GIVEN. PT VERBALIZED UNDERSTANDING.

## 2025-02-10 NOTE — DISCHARGE INSTRUCTIONS
IMPORTANT INSTRUCTIONS - PRE-ADMISSION TESTING  LAST DOSE  MOUNJARY 2/8/25  LAST DOSE VITAMIN AND SUPPLEMENT 2/10/25  LAST DOSE OF LASIX AND LOSARTAN 2/16/25    DO NOT EAT OR CHEW anything after midnight 2/156/25 2/16/95 CLEAR LIQUID DIET WITH GATORADE SUPPLEMENT AS DIRECTED.   2/16/25 BOWEL PREP AS DIRECTED BY DR GRACE.  2/16/25  ORAL ANTIBIOTICS AS PRESCRIBED BY DR. GRACE.    2/16/25 SHOWER AS SCHEDULED AND  DIRECTED WITH SURGICAL SOAP.   You may have CLEAR liquids up to _____3_ hours prior to ARRIVAL time.   Take the following medications the morning of your procedure with JUST A SIP OF WATER:  _PROZAC  TYLENOL ______________________________________________________________________________________________________________________________________________________________________________________    DO NOT BRING your medications to the hospital with you, UNLESS something has changed since your PRE-Admission Testing appointment.  Hold all vitamins, supplements, and NSAIDS (Non- steroidal anti-inflammatory meds) for one week prior to surgery (you MAY take Tylenol or Acetaminophen).  If you are diabetic, check your blood sugar the morning of your procedure. If it is less than 70 or if you are feeling symptomatic, call the following number for further instructions: [phone] SAME  DAY SURGERY WILL CALL ARRIVAL FRIDAY 2/14/25 BY 4 P.M______.  Use your inhalers/nebulizers as usual, the morning of your procedure. BRING YOUR INHALERS with you. NA  Bring your CPAP or BIPAP to hospital, ONLY IF YOU WILL BE SPENDING THE NIGHT.   Make sure you have a ride home and have someone who will stay with you the day of your procedure after you go home.  If you have any questions, please call your Pre-Admission Testing Nurse, ____________LOUANN____ at [phone]_______.   Per anesthesia request, do not smoke for 24 hours before your procedure or as instructed by your surgeon.    PREOPERATIVE (BEFORE SURGERY)               BATHING INSTRUCTIONS  Instructions:    You will need to shower 2  separate times utilizing the soap provided; at the times indicated   below:     - PM  2/16/25 AND AM 2/17/25     Wash your hair and face with normal shampoo and soap, rinse it well before using the surgical soap.      In the shower, wet the skin completely with water from your neck to your feet. Apply the cleanser to your   body ONLY FROM THE NECK TO YOUR FEET.     Do NOT USE THE CLEANSER ON YOUR FACE, HEAD, OR GENITAL (PRIVATE) AREAS.   Keep it out of your eyes, ears, and mouth because of the risk of injury to those areas.      Scrub with a clean washcloth for each bath utilizing the soap provided from the top of your body to the   bottom starting at the neck area.      Pay close attention to your armpits, groin area, and the site of surgery.      Wash your body gently for 5 minutes. Stand outside the stream or turn off the water while scrubbing your   body. Do NOT wash with your regular soap after the surgical cleanser is used.      RINSE THE CLEANSER OFF COMPLETELY with plenty of water. Rinse the area again thoroughly.      Dry off with a clean towel. The surgical soap can cause dryness; however do NOT APPLY LOTION,   CREAM, POWDER, and/or DEODORANT AFTER SHOWERING.     Be sure to where clean clothes after showering.      Ensure CLEAN BED LINENS AFTER FIRST wash with the surgical soap.      NO PETS ALLOWED IN THE BED with you after utilizing the surgical soap.

## 2025-02-11 ENCOUNTER — ANESTHESIA EVENT (OUTPATIENT)
Dept: PERIOP | Facility: HOSPITAL | Age: 70
End: 2025-02-11
Payer: MEDICARE

## 2025-02-17 ENCOUNTER — ANESTHESIA (OUTPATIENT)
Dept: PERIOP | Facility: HOSPITAL | Age: 70
End: 2025-02-17
Payer: MEDICARE

## 2025-02-17 ENCOUNTER — ANESTHESIA EVENT CONVERTED (OUTPATIENT)
Dept: ANESTHESIOLOGY | Facility: HOSPITAL | Age: 70
DRG: 331 | End: 2025-02-17
Payer: MEDICARE

## 2025-02-17 ENCOUNTER — HOSPITAL ENCOUNTER (INPATIENT)
Facility: HOSPITAL | Age: 70
LOS: 2 days | Discharge: HOME | DRG: 331 | End: 2025-02-19
Attending: SURGERY | Admitting: SURGERY
Payer: MEDICARE

## 2025-02-17 DIAGNOSIS — R26.2 DIFFICULTY WALKING: Primary | ICD-10-CM

## 2025-02-17 DIAGNOSIS — Z78.9 DECREASED ACTIVITIES OF DAILY LIVING (ADL): ICD-10-CM

## 2025-02-17 DIAGNOSIS — K63.89 COLONIC MASS: ICD-10-CM

## 2025-02-17 DIAGNOSIS — Z98.890 S/P ROBOT-ASSISTED SURGICAL PROCEDURE: ICD-10-CM

## 2025-02-17 LAB
ABO GROUP BLD: NORMAL
BLD GP AB SCN SERPL QL: NEGATIVE
GLUCOSE BLDC GLUCOMTR-MCNC: 165 MG/DL (ref 70–99)
GLUCOSE BLDC GLUCOMTR-MCNC: 97 MG/DL (ref 70–99)
RH BLD: NEGATIVE
T&S EXPIRATION DATE: NORMAL

## 2025-02-17 PROCEDURE — 25010000002 MIDAZOLAM PER 1MG: Performed by: ANESTHESIOLOGY

## 2025-02-17 PROCEDURE — 88342 IMHCHEM/IMCYTCHM 1ST ANTB: CPT | Performed by: SURGERY

## 2025-02-17 PROCEDURE — 25010000002 ONDANSETRON PER 1 MG: Performed by: NURSE ANESTHETIST, CERTIFIED REGISTERED

## 2025-02-17 PROCEDURE — 25810000003 SODIUM CHLORIDE 0.9 % SOLUTION: Performed by: SURGERY

## 2025-02-17 PROCEDURE — 25010000002 METOCLOPRAMIDE PER 10 MG: Performed by: ANESTHESIOLOGY

## 2025-02-17 PROCEDURE — 25010000002 SUGAMMADEX 200 MG/2ML SOLUTION: Performed by: NURSE ANESTHETIST, CERTIFIED REGISTERED

## 2025-02-17 PROCEDURE — 44204 LAPARO PARTIAL COLECTOMY: CPT | Performed by: SPECIALIST/TECHNOLOGIST, OTHER

## 2025-02-17 PROCEDURE — 82948 REAGENT STRIP/BLOOD GLUCOSE: CPT | Performed by: ANESTHESIOLOGY

## 2025-02-17 PROCEDURE — 25810000003 LACTATED RINGERS PER 1000 ML: Performed by: NURSE ANESTHETIST, CERTIFIED REGISTERED

## 2025-02-17 PROCEDURE — 25010000002 DEXAMETHASONE PER 1 MG: Performed by: ANESTHESIOLOGY

## 2025-02-17 PROCEDURE — 25810000003 LACTATED RINGERS PER 1000 ML: Performed by: ANESTHESIOLOGY

## 2025-02-17 PROCEDURE — 0DTF4ZZ RESECTION OF RIGHT LARGE INTESTINE, PERCUTANEOUS ENDOSCOPIC APPROACH: ICD-10-PCS | Performed by: SURGERY

## 2025-02-17 PROCEDURE — 86901 BLOOD TYPING SEROLOGIC RH(D): CPT | Performed by: SURGERY

## 2025-02-17 PROCEDURE — 25010000002 BUPIVACAINE (PF) 0.25 % SOLUTION 10 ML VIAL: Performed by: SURGERY

## 2025-02-17 PROCEDURE — 94799 UNLISTED PULMONARY SVC/PX: CPT

## 2025-02-17 PROCEDURE — 25010000002 MAGNESIUM SULFATE PER 500 MG OF MAGNESIUM: Performed by: NURSE ANESTHETIST, CERTIFIED REGISTERED

## 2025-02-17 PROCEDURE — 25010000002 CEFAZOLIN 3 G RECONSTITUTED SOLUTION 1 EACH VIAL: Performed by: SURGERY

## 2025-02-17 PROCEDURE — 99223 1ST HOSP IP/OBS HIGH 75: CPT | Performed by: SURGERY

## 2025-02-17 PROCEDURE — 25010000002 FENTANYL CITRATE (PF) 50 MCG/ML SOLUTION: Performed by: NURSE ANESTHETIST, CERTIFIED REGISTERED

## 2025-02-17 PROCEDURE — 88341 IMHCHEM/IMCYTCHM EA ADD ANTB: CPT | Performed by: SURGERY

## 2025-02-17 PROCEDURE — 44204 LAPARO PARTIAL COLECTOMY: CPT | Performed by: SURGERY

## 2025-02-17 PROCEDURE — 94761 N-INVAS EAR/PLS OXIMETRY MLT: CPT

## 2025-02-17 PROCEDURE — 86900 BLOOD TYPING SEROLOGIC ABO: CPT | Performed by: SURGERY

## 2025-02-17 PROCEDURE — 88309 TISSUE EXAM BY PATHOLOGIST: CPT | Performed by: SURGERY

## 2025-02-17 PROCEDURE — 86850 RBC ANTIBODY SCREEN: CPT | Performed by: SURGERY

## 2025-02-17 PROCEDURE — 82948 REAGENT STRIP/BLOOD GLUCOSE: CPT | Performed by: NURSE ANESTHETIST, CERTIFIED REGISTERED

## 2025-02-17 PROCEDURE — 25010000002 METRONIDAZOLE 500 MG/100ML SOLUTION: Performed by: SURGERY

## 2025-02-17 PROCEDURE — 25010000002 PROPOFOL 10 MG/ML EMULSION: Performed by: NURSE ANESTHETIST, CERTIFIED REGISTERED

## 2025-02-17 PROCEDURE — 8E0W4CZ ROBOTIC ASSISTED PROCEDURE OF TRUNK REGION, PERCUTANEOUS ENDOSCOPIC APPROACH: ICD-10-PCS | Performed by: SURGERY

## 2025-02-17 PROCEDURE — 25010000002 LIDOCAINE PF 2% 2 % SOLUTION: Performed by: NURSE ANESTHETIST, CERTIFIED REGISTERED

## 2025-02-17 PROCEDURE — 25010000002 LIDOCAINE 1% - EPINEPHRINE 1:100000 1 %-1:100000 SOLUTION 20 ML VIAL: Performed by: SURGERY

## 2025-02-17 PROCEDURE — 25010000002 HYDROMORPHONE 1 MG/ML SOLUTION: Performed by: NURSE ANESTHETIST, CERTIFIED REGISTERED

## 2025-02-17 DEVICE — ABSORBABLE HEMOSTAT (OXIDIZED REGENERATED CELLULOSE, U.S.P.)
Type: IMPLANTABLE DEVICE | Site: ABDOMEN | Status: FUNCTIONAL
Brand: SURGICEL

## 2025-02-17 DEVICE — STAPLER 60 RELOAD BLUE
Type: IMPLANTABLE DEVICE | Site: ABDOMEN | Status: FUNCTIONAL
Brand: SUREFORM

## 2025-02-17 DEVICE — ABSORBABLE WOUND CLOSURE DEVICE
Type: IMPLANTABLE DEVICE | Site: ABDOMEN | Status: FUNCTIONAL
Brand: V-LOC 180

## 2025-02-17 RX ORDER — HEPARIN SODIUM 5000 [USP'U]/ML
5000 INJECTION, SOLUTION INTRAVENOUS; SUBCUTANEOUS EVERY 12 HOURS SCHEDULED
Status: DISCONTINUED | OUTPATIENT
Start: 2025-02-19 | End: 2025-02-19 | Stop reason: HOSPADM

## 2025-02-17 RX ORDER — PROMETHAZINE HYDROCHLORIDE 25 MG/1
25 SUPPOSITORY RECTAL ONCE AS NEEDED
Status: DISCONTINUED | OUTPATIENT
Start: 2025-02-17 | End: 2025-02-17 | Stop reason: HOSPADM

## 2025-02-17 RX ORDER — HYDROCHLOROTHIAZIDE 25 MG/1
25 TABLET ORAL
Status: DISCONTINUED | OUTPATIENT
Start: 2025-02-17 | End: 2025-02-19 | Stop reason: HOSPADM

## 2025-02-17 RX ORDER — GABAPENTIN 300 MG/1
300 CAPSULE ORAL ONCE
Status: COMPLETED | OUTPATIENT
Start: 2025-02-17 | End: 2025-02-17

## 2025-02-17 RX ORDER — EPHEDRINE SULFATE 50 MG/ML
INJECTION INTRAVENOUS
Status: COMPLETED
Start: 2025-02-17 | End: 2025-02-17

## 2025-02-17 RX ORDER — MIDAZOLAM HYDROCHLORIDE 2 MG/2ML
2 INJECTION, SOLUTION INTRAMUSCULAR; INTRAVENOUS ONCE
Status: COMPLETED | OUTPATIENT
Start: 2025-02-17 | End: 2025-02-17

## 2025-02-17 RX ORDER — EPHEDRINE SULFATE 50 MG/ML
INJECTION, SOLUTION INTRAVENOUS AS NEEDED
Status: DISCONTINUED | OUTPATIENT
Start: 2025-02-17 | End: 2025-02-17

## 2025-02-17 RX ORDER — OXYCODONE HYDROCHLORIDE 5 MG/1
5 TABLET ORAL
Status: DISCONTINUED | OUTPATIENT
Start: 2025-02-17 | End: 2025-02-17 | Stop reason: HOSPADM

## 2025-02-17 RX ORDER — OXYCODONE AND ACETAMINOPHEN 7.5; 325 MG/1; MG/1
2 TABLET ORAL EVERY 4 HOURS PRN
Status: DISCONTINUED | OUTPATIENT
Start: 2025-02-17 | End: 2025-02-17 | Stop reason: HOSPADM

## 2025-02-17 RX ORDER — BUPIVACAINE HYDROCHLORIDE AND EPINEPHRINE 5; 5 MG/ML; UG/ML
INJECTION, SOLUTION EPIDURAL; INTRACAUDAL; PERINEURAL
Status: COMPLETED
Start: 2025-02-17 | End: 2025-02-17

## 2025-02-17 RX ORDER — PANTOPRAZOLE SODIUM 40 MG/10ML
40 INJECTION, POWDER, LYOPHILIZED, FOR SOLUTION INTRAVENOUS
Status: DISCONTINUED | OUTPATIENT
Start: 2025-02-18 | End: 2025-02-19 | Stop reason: HOSPADM

## 2025-02-17 RX ORDER — PREGABALIN 100 MG/1
200 CAPSULE ORAL NIGHTLY
Status: DISCONTINUED | OUTPATIENT
Start: 2025-02-18 | End: 2025-02-19 | Stop reason: HOSPADM

## 2025-02-17 RX ORDER — SODIUM CHLORIDE 0.9 % (FLUSH) 0.9 %
10 SYRINGE (ML) INJECTION AS NEEDED
Status: DISCONTINUED | OUTPATIENT
Start: 2025-02-17 | End: 2025-02-17 | Stop reason: HOSPADM

## 2025-02-17 RX ORDER — ONDANSETRON 2 MG/ML
4 INJECTION INTRAMUSCULAR; INTRAVENOUS EVERY 6 HOURS PRN
Status: DISCONTINUED | OUTPATIENT
Start: 2025-02-17 | End: 2025-02-19 | Stop reason: HOSPADM

## 2025-02-17 RX ORDER — PHENYLEPHRINE HCL IN 0.9% NACL 1 MG/10 ML
SYRINGE (ML) INTRAVENOUS AS NEEDED
Status: DISCONTINUED | OUTPATIENT
Start: 2025-02-17 | End: 2025-02-17 | Stop reason: SURG

## 2025-02-17 RX ORDER — SODIUM CHLORIDE, SODIUM LACTATE, POTASSIUM CHLORIDE, CALCIUM CHLORIDE 600; 310; 30; 20 MG/100ML; MG/100ML; MG/100ML; MG/100ML
20 INJECTION, SOLUTION INTRAVENOUS CONTINUOUS PRN
Status: DISCONTINUED | OUTPATIENT
Start: 2025-02-17 | End: 2025-02-17 | Stop reason: HOSPADM

## 2025-02-17 RX ORDER — MORPHINE SULFATE 2 MG/ML
2 INJECTION, SOLUTION INTRAMUSCULAR; INTRAVENOUS
Status: DISCONTINUED | OUTPATIENT
Start: 2025-02-17 | End: 2025-02-19 | Stop reason: HOSPADM

## 2025-02-17 RX ORDER — FLUOXETINE 10 MG/1
10 CAPSULE ORAL DAILY
Status: DISCONTINUED | OUTPATIENT
Start: 2025-02-18 | End: 2025-02-19 | Stop reason: HOSPADM

## 2025-02-17 RX ORDER — DEXAMETHASONE SODIUM PHOSPHATE 4 MG/ML
INJECTION, SOLUTION INTRA-ARTICULAR; INTRALESIONAL; INTRAMUSCULAR; INTRAVENOUS; SOFT TISSUE
Status: COMPLETED
Start: 2025-02-17 | End: 2025-02-17

## 2025-02-17 RX ORDER — PREGABALIN 75 MG/1
75 CAPSULE ORAL ONCE
Status: COMPLETED | OUTPATIENT
Start: 2025-02-17 | End: 2025-02-17

## 2025-02-17 RX ORDER — DEXMEDETOMIDINE HYDROCHLORIDE 100 UG/ML
INJECTION, SOLUTION INTRAVENOUS
Status: COMPLETED
Start: 2025-02-17 | End: 2025-02-17

## 2025-02-17 RX ORDER — DEXMEDETOMIDINE HYDROCHLORIDE 100 UG/ML
INJECTION, SOLUTION INTRAVENOUS AS NEEDED
Status: DISCONTINUED | OUTPATIENT
Start: 2025-02-17 | End: 2025-02-17 | Stop reason: SURG

## 2025-02-17 RX ORDER — DEXAMETHASONE SODIUM PHOSPHATE 4 MG/ML
INJECTION, SOLUTION INTRA-ARTICULAR; INTRALESIONAL; INTRAMUSCULAR; INTRAVENOUS; SOFT TISSUE
Status: COMPLETED | OUTPATIENT
Start: 2025-02-17 | End: 2025-02-17

## 2025-02-17 RX ORDER — ACETAMINOPHEN 500 MG
1000 TABLET ORAL ONCE
Status: DISCONTINUED | OUTPATIENT
Start: 2025-02-17 | End: 2025-02-17

## 2025-02-17 RX ORDER — SODIUM CHLORIDE, SODIUM LACTATE, POTASSIUM CHLORIDE, CALCIUM CHLORIDE 600; 310; 30; 20 MG/100ML; MG/100ML; MG/100ML; MG/100ML
INJECTION, SOLUTION INTRAVENOUS CONTINUOUS PRN
Status: DISCONTINUED | OUTPATIENT
Start: 2025-02-17 | End: 2025-02-17 | Stop reason: SURG

## 2025-02-17 RX ORDER — METOCLOPRAMIDE HYDROCHLORIDE 5 MG/ML
10 INJECTION INTRAMUSCULAR; INTRAVENOUS
Status: COMPLETED | OUTPATIENT
Start: 2025-02-17 | End: 2025-02-17

## 2025-02-17 RX ORDER — ONDANSETRON 2 MG/ML
INJECTION INTRAMUSCULAR; INTRAVENOUS AS NEEDED
Status: DISCONTINUED | OUTPATIENT
Start: 2025-02-17 | End: 2025-02-17 | Stop reason: SURG

## 2025-02-17 RX ORDER — METRONIDAZOLE 500 MG/100ML
500 INJECTION, SOLUTION INTRAVENOUS ONCE
Status: COMPLETED | OUTPATIENT
Start: 2025-02-17 | End: 2025-02-17

## 2025-02-17 RX ORDER — NALOXONE HCL 0.4 MG/ML
0.4 VIAL (ML) INJECTION
Status: DISCONTINUED | OUTPATIENT
Start: 2025-02-17 | End: 2025-02-19 | Stop reason: HOSPADM

## 2025-02-17 RX ORDER — ONDANSETRON 2 MG/ML
4 INJECTION INTRAMUSCULAR; INTRAVENOUS ONCE AS NEEDED
Status: DISCONTINUED | OUTPATIENT
Start: 2025-02-17 | End: 2025-02-17 | Stop reason: HOSPADM

## 2025-02-17 RX ORDER — LIDOCAINE HYDROCHLORIDE 20 MG/ML
INJECTION, SOLUTION EPIDURAL; INFILTRATION; INTRACAUDAL; PERINEURAL AS NEEDED
Status: DISCONTINUED | OUTPATIENT
Start: 2025-02-17 | End: 2025-02-17 | Stop reason: SURG

## 2025-02-17 RX ORDER — EPHEDRINE SULFATE 50 MG/ML
INJECTION, SOLUTION INTRAVENOUS AS NEEDED
Status: DISCONTINUED | OUTPATIENT
Start: 2025-02-17 | End: 2025-02-17 | Stop reason: SURG

## 2025-02-17 RX ORDER — SCOPOLAMINE 1 MG/3D
1 PATCH, EXTENDED RELEASE TRANSDERMAL CONTINUOUS
Status: DISCONTINUED | OUTPATIENT
Start: 2025-02-17 | End: 2025-02-17

## 2025-02-17 RX ORDER — ACETAMINOPHEN 500 MG
1000 TABLET ORAL ONCE
Status: COMPLETED | OUTPATIENT
Start: 2025-02-17 | End: 2025-02-17

## 2025-02-17 RX ORDER — MAGNESIUM HYDROXIDE 1200 MG/15ML
LIQUID ORAL AS NEEDED
Status: DISCONTINUED | OUTPATIENT
Start: 2025-02-17 | End: 2025-02-17 | Stop reason: HOSPADM

## 2025-02-17 RX ORDER — FUROSEMIDE 20 MG/1
20 TABLET ORAL DAILY PRN
Status: DISCONTINUED | OUTPATIENT
Start: 2025-02-17 | End: 2025-02-19 | Stop reason: HOSPADM

## 2025-02-17 RX ORDER — HYDROCODONE BITARTRATE AND ACETAMINOPHEN 5; 325 MG/1; MG/1
1 TABLET ORAL EVERY 4 HOURS PRN
Status: DISCONTINUED | OUTPATIENT
Start: 2025-02-17 | End: 2025-02-19 | Stop reason: HOSPADM

## 2025-02-17 RX ORDER — MAGNESIUM SULFATE HEPTAHYDRATE 500 MG/ML
INJECTION, SOLUTION INTRAMUSCULAR; INTRAVENOUS AS NEEDED
Status: DISCONTINUED | OUTPATIENT
Start: 2025-02-17 | End: 2025-02-17 | Stop reason: SURG

## 2025-02-17 RX ORDER — MELOXICAM 7.5 MG/1
15 TABLET ORAL ONCE
Status: COMPLETED | OUTPATIENT
Start: 2025-02-17 | End: 2025-02-17

## 2025-02-17 RX ORDER — PROPOFOL 10 MG/ML
VIAL (ML) INTRAVENOUS AS NEEDED
Status: DISCONTINUED | OUTPATIENT
Start: 2025-02-17 | End: 2025-02-17 | Stop reason: SURG

## 2025-02-17 RX ORDER — ONDANSETRON 4 MG/1
4 TABLET, ORALLY DISINTEGRATING ORAL EVERY 6 HOURS PRN
Status: DISCONTINUED | OUTPATIENT
Start: 2025-02-17 | End: 2025-02-19 | Stop reason: HOSPADM

## 2025-02-17 RX ORDER — ROCURONIUM BROMIDE 10 MG/ML
INJECTION, SOLUTION INTRAVENOUS AS NEEDED
Status: DISCONTINUED | OUTPATIENT
Start: 2025-02-17 | End: 2025-02-17 | Stop reason: SURG

## 2025-02-17 RX ORDER — GABAPENTIN 300 MG/1
300 CAPSULE ORAL 2 TIMES DAILY
Status: DISCONTINUED | OUTPATIENT
Start: 2025-02-17 | End: 2025-02-19 | Stop reason: HOSPADM

## 2025-02-17 RX ORDER — FENTANYL CITRATE 50 UG/ML
INJECTION, SOLUTION INTRAMUSCULAR; INTRAVENOUS AS NEEDED
Status: DISCONTINUED | OUTPATIENT
Start: 2025-02-17 | End: 2025-02-17 | Stop reason: SURG

## 2025-02-17 RX ORDER — SODIUM CHLORIDE 9 MG/ML
40 INJECTION, SOLUTION INTRAVENOUS AS NEEDED
Status: DISCONTINUED | OUTPATIENT
Start: 2025-02-17 | End: 2025-02-17 | Stop reason: HOSPADM

## 2025-02-17 RX ORDER — LOSARTAN POTASSIUM 50 MG/1
100 TABLET ORAL
Status: DISCONTINUED | OUTPATIENT
Start: 2025-02-17 | End: 2025-02-19 | Stop reason: HOSPADM

## 2025-02-17 RX ORDER — SODIUM CHLORIDE 9 MG/ML
50 INJECTION, SOLUTION INTRAVENOUS CONTINUOUS
Status: DISCONTINUED | OUTPATIENT
Start: 2025-02-17 | End: 2025-02-18

## 2025-02-17 RX ORDER — PROMETHAZINE HYDROCHLORIDE 25 MG/1
25 TABLET ORAL ONCE AS NEEDED
Status: DISCONTINUED | OUTPATIENT
Start: 2025-02-17 | End: 2025-02-17 | Stop reason: HOSPADM

## 2025-02-17 RX ORDER — SUCCINYLCHOLINE/SOD CL,ISO/PF 100 MG/5ML
SYRINGE (ML) INTRAVENOUS AS NEEDED
Status: DISCONTINUED | OUTPATIENT
Start: 2025-02-17 | End: 2025-02-17 | Stop reason: SURG

## 2025-02-17 RX ORDER — DEXAMETHASONE SODIUM PHOSPHATE 4 MG/ML
INJECTION, SOLUTION INTRA-ARTICULAR; INTRALESIONAL; INTRAMUSCULAR; INTRAVENOUS; SOFT TISSUE AS NEEDED
Status: DISCONTINUED | OUTPATIENT
Start: 2025-02-17 | End: 2025-02-17 | Stop reason: SURG

## 2025-02-17 RX ORDER — BUPIVACAINE HYDROCHLORIDE AND EPINEPHRINE 5; 5 MG/ML; UG/ML
INJECTION, SOLUTION EPIDURAL; INTRACAUDAL; PERINEURAL
Status: COMPLETED | OUTPATIENT
Start: 2025-02-17 | End: 2025-02-17

## 2025-02-17 RX ADMIN — LIDOCAINE HYDROCHLORIDE 100 MG: 20 INJECTION, SOLUTION EPIDURAL; INFILTRATION; INTRACAUDAL; PERINEURAL at 12:19

## 2025-02-17 RX ADMIN — MAGNESIUM SULFATE HEPTAHYDRATE 1 G: 500 INJECTION, SOLUTION INTRAMUSCULAR; INTRAVENOUS at 13:01

## 2025-02-17 RX ADMIN — HYDROMORPHONE HYDROCHLORIDE 0.25 MG: 1 INJECTION, SOLUTION INTRAMUSCULAR; INTRAVENOUS; SUBCUTANEOUS at 15:05

## 2025-02-17 RX ADMIN — METOCLOPRAMIDE 10 MG: 5 INJECTION, SOLUTION INTRAMUSCULAR; INTRAVENOUS at 11:45

## 2025-02-17 RX ADMIN — MELOXICAM 15 MG: 7.5 TABLET ORAL at 10:51

## 2025-02-17 RX ADMIN — EPHEDRINE SULFATE 50 MG: 50 INJECTION INTRAVENOUS at 12:07

## 2025-02-17 RX ADMIN — DEXAMETHASONE SODIUM PHOSPHATE 4 MG: 4 INJECTION, SOLUTION INTRAMUSCULAR; INTRAVENOUS at 11:42

## 2025-02-17 RX ADMIN — SCOPALAMINE 1 PATCH: 1 PATCH, EXTENDED RELEASE TRANSDERMAL at 10:45

## 2025-02-17 RX ADMIN — SODIUM CHLORIDE, POTASSIUM CHLORIDE, SODIUM LACTATE AND CALCIUM CHLORIDE: 600; 310; 30; 20 INJECTION, SOLUTION INTRAVENOUS at 14:50

## 2025-02-17 RX ADMIN — ONDANSETRON 4 MG: 2 INJECTION INTRAMUSCULAR; INTRAVENOUS at 15:07

## 2025-02-17 RX ADMIN — FENTANYL CITRATE 50 MCG: 50 INJECTION, SOLUTION INTRAMUSCULAR; INTRAVENOUS at 12:57

## 2025-02-17 RX ADMIN — DEXMEDETOMIDINE 10 MCG: 100 INJECTION, SOLUTION, CONCENTRATE INTRAVENOUS at 12:15

## 2025-02-17 RX ADMIN — FENTANYL CITRATE 50 MCG: 50 INJECTION, SOLUTION INTRAMUSCULAR; INTRAVENOUS at 13:21

## 2025-02-17 RX ADMIN — LOSARTAN POTASSIUM 100 MG: 50 TABLET, FILM COATED ORAL at 18:54

## 2025-02-17 RX ADMIN — PROPOFOL 150 MG: 10 INJECTION, EMULSION INTRAVENOUS at 12:19

## 2025-02-17 RX ADMIN — DEXAMETHASONE SODIUM PHOSPHATE 4 MG: 4 INJECTION, SOLUTION INTRAMUSCULAR; INTRAVENOUS at 11:40

## 2025-02-17 RX ADMIN — SODIUM CHLORIDE 50 ML/HR: 9 INJECTION, SOLUTION INTRAVENOUS at 19:46

## 2025-02-17 RX ADMIN — SUGAMMADEX 300 MG: 100 INJECTION, SOLUTION INTRAVENOUS at 15:32

## 2025-02-17 RX ADMIN — HYDROCHLOROTHIAZIDE 25 MG: 25 TABLET ORAL at 18:54

## 2025-02-17 RX ADMIN — ROCURONIUM BROMIDE 40 MG: 50 INJECTION INTRAVENOUS at 12:24

## 2025-02-17 RX ADMIN — GABAPENTIN 300 MG: 300 CAPSULE ORAL at 10:33

## 2025-02-17 RX ADMIN — SODIUM CHLORIDE, SODIUM LACTATE, POTASSIUM CHLORIDE, CALCIUM CHLORIDE 20 ML/HR: 20; 30; 600; 310 INJECTION, SOLUTION INTRAVENOUS at 10:33

## 2025-02-17 RX ADMIN — GABAPENTIN 300 MG: 300 CAPSULE ORAL at 18:54

## 2025-02-17 RX ADMIN — SODIUM CHLORIDE 3000 MG: 9 INJECTION, SOLUTION INTRAVENOUS at 12:39

## 2025-02-17 RX ADMIN — ROCURONIUM BROMIDE 10 MG: 50 INJECTION INTRAVENOUS at 12:19

## 2025-02-17 RX ADMIN — HYDROMORPHONE HYDROCHLORIDE 0.5 MG: 1 INJECTION, SOLUTION INTRAMUSCULAR; INTRAVENOUS; SUBCUTANEOUS at 13:25

## 2025-02-17 RX ADMIN — DEXMEDETOMIDINE 20 MCG: 100 INJECTION, SOLUTION, CONCENTRATE INTRAVENOUS at 11:30

## 2025-02-17 RX ADMIN — PREGABALIN 75 MG: 75 CAPSULE ORAL at 10:33

## 2025-02-17 RX ADMIN — HYDROMORPHONE HYDROCHLORIDE 0.25 MG: 1 INJECTION, SOLUTION INTRAMUSCULAR; INTRAVENOUS; SUBCUTANEOUS at 15:32

## 2025-02-17 RX ADMIN — METRONIDAZOLE 500 MG: 5 INJECTION, SOLUTION INTRAVENOUS at 12:29

## 2025-02-17 RX ADMIN — Medication 200 MG: at 12:19

## 2025-02-17 RX ADMIN — BUPIVACAINE HYDROCHLORIDE AND EPINEPHRINE BITARTRATE 20 ML: 5; .005 INJECTION, SOLUTION EPIDURAL; INTRACAUDAL; PERINEURAL at 11:40

## 2025-02-17 RX ADMIN — ACETAMINOPHEN 1000 MG: 500 TABLET ORAL at 10:33

## 2025-02-17 RX ADMIN — DEXAMETHASONE SODIUM PHOSPHATE 8 MG: 4 INJECTION, SOLUTION INTRA-ARTICULAR; INTRALESIONAL; INTRAMUSCULAR; INTRAVENOUS; SOFT TISSUE at 12:44

## 2025-02-17 RX ADMIN — SODIUM CHLORIDE, POTASSIUM CHLORIDE, SODIUM LACTATE AND CALCIUM CHLORIDE: 600; 310; 30; 20 INJECTION, SOLUTION INTRAVENOUS at 12:25

## 2025-02-17 RX ADMIN — ROCURONIUM BROMIDE 20 MG: 50 INJECTION INTRAVENOUS at 13:31

## 2025-02-17 RX ADMIN — MIDAZOLAM HYDROCHLORIDE 2 MG: 1 INJECTION, SOLUTION INTRAMUSCULAR; INTRAVENOUS at 11:25

## 2025-02-17 RX ADMIN — BUPIVACAINE HYDROCHLORIDE AND EPINEPHRINE BITARTRATE 20 ML: 5; .005 INJECTION, SOLUTION EPIDURAL; INTRACAUDAL; PERINEURAL at 11:42

## 2025-02-17 RX ADMIN — ROCURONIUM BROMIDE 10 MG: 50 INJECTION INTRAVENOUS at 14:23

## 2025-02-17 RX ADMIN — Medication 50 MCG: at 12:50

## 2025-02-17 NOTE — ANESTHESIA POSTPROCEDURE EVALUATION
Patient: Bailey Reese    Procedure Summary       Date: 02/17/25 Room / Location: [facility] OR 11 / [facility] MAIN OR    Anesthesia Start: 1210 Anesthesia Stop: 1554    Procedure: COLON RESECTION LAPAROSCOPIC RIGHT WITH DAVINCI ROBOT (Abdomen) Diagnosis:       Colonic mass      (Colonic mass [K63.89])    Surgeons: Nura Smith MD Provider: Simon Brice MD    Anesthesia Type: general with block ASA Status: 3            Anesthesia Type: general with block    Vitals  Vitals Value Taken Time   /77 02/17/25 1730   Temp 36.8 °C (98.3 °F) 02/17/25 1725   Pulse 66 02/17/25 1730   Resp 14 02/17/25 1730   SpO2 91 % 02/17/25 1730           Post Anesthesia Care and Evaluation    Patient location during evaluation: bedside  Patient participation: complete - patient participated  Level of consciousness: awake  Pain management: adequate    Airway patency: patent  PONV Status: none  Cardiovascular status: acceptable and stable  Respiratory status: acceptable  Hydration status: acceptable

## 2025-02-17 NOTE — ANESTHESIA PROCEDURE NOTES
Airway  Urgency: elective    Date/Time: 2/17/2025 12:21 PM  Airway not difficult    Indications and Patient Condition  Indications for airway management: airway protection    Preoxygenated: yes  MILS maintained throughout  Mask difficulty assessment: 1 - vent by mask    Final Airway Details  Final airway type: endotracheal airway      Successful airway: ETT  Cuffed: yes   Successful intubation technique: direct laryngoscopy  Facilitating devices/methods: intubating stylet  Endotracheal tube insertion site: oral  Blade: Hugh  Blade size: 3  ETT size (mm): 7.0  Cormack-Lehane Classification: grade IIa - partial view of glottis  Placement verified by: chest auscultation, capnometry and palpation of cuff   Measured from: lips  ETT/EBT  to lips (cm): 21  Number of attempts at approach: 1  Assessment: lips, teeth, and gum same as pre-op and atraumatic intubation
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
## 2025-02-17 NOTE — ANESTHESIA PREPROCEDURE EVALUATION
Anesthesia Evaluation     Patient summary reviewed and Nursing notes reviewed   no history of anesthetic complications:   NPO Solid Status: > 8 hours  NPO Liquid Status: > 2 hours           Airway   Mallampati: III  TM distance: >3 FB  Neck ROM: full  No difficulty expected  Dental      Pulmonary - negative pulmonary ROS and normal exam    breath sounds clear to auscultation  (+) ,sleep apnea  Cardiovascular - negative cardio ROS and normal exam  Exercise tolerance: poor (<4 METS)    ECG reviewed  Rhythm: regular  Rate: normal    (+) hypertension      Neuro/Psych- negative ROS  GI/Hepatic/Renal/Endo - negative ROS   (+) GI bleeding lower , diabetes mellitus type 2    Musculoskeletal (-) negative ROS    Abdominal    Substance History - negative use     OB/GYN negative ob/gyn ROS         Other - negative ROS  arthritis,     ROS/Med Hx Other: HX HTN, DM, DARIANA. METS<4 DUE TO KNEE PIAN. NO CP, +SOA. REVIEWEKG. [facility]- PER DR. GOETZ NEEDS CARDIAC CLEARANCE DUE TO ABN EKG.  ECHO 1/24/25 EF 55%,NO VALVE STENOSIS,STRESS 1/23/25 LOW RISK STUDY,CARDS OV 1/17/25. CARDS CLEARED,ACCEPTABLE RISK 1/27/25. KT        ABNORMAL ECG - 1/13/25  Sinus rhythm  Inferior  infarct, old  Anterior  infarct, old  No previous ECG available for comparison             Anesthesia Plan    ASA 3     general with block     (Patient understands anesthesia not responsible for dental damage.    GA + erector spinae)  intravenous induction     Anesthetic plan, risks, benefits, and alternatives have been provided, discussed and informed consent has been obtained with: patient.    Use of blood products discussed with patient .    Plan discussed with CRNA.      CODE STATUS:

## 2025-02-17 NOTE — ANESTHESIA PROCEDURE NOTES
Peripheral Block      Patient reassessed immediately prior to procedure    Patient location during procedure: pre-op  Start time: 2/17/2025 11:36 AM  Stop time: 2/17/2025 11:42 AM  Reason for block: at surgeon's request and post-op pain management  Performed by  Anesthesiologist: Simon Brice MD  Preanesthetic Checklist  Completed: patient identified, IV checked, site marked, risks and benefits discussed, surgical consent, monitors and equipment checked, pre-op evaluation and timeout performed  Prep:  Pt Position: sitting  Sterile barriers:partial drape, alcohol skin prep, cap, washed/disinfected hands, gloves and mask  Prep: ChloraPrep  Patient monitoring: blood pressure monitoring, EKG and continuous pulse oximetry  Procedure    Sedation: yes  Performed under: local infiltration  Guidance:ultrasound guided and landmark technique    ULTRASOUND INTERPRETATION.  Using ultrasound guidance a 22 G gauge needle was placed in close proximity to the nerve, at which point, under ultrasound guidance anesthetic was injected in the area of the nerve and spread of the anesthesia was seen on ultrasound in close proximity thereto.  There were no abnormalities seen on ultrasound; a digital image was taken; and the patient tolerated the procedure with no complications. Images:still images obtained, printed/placed on chart    Laterality:Bilateral  Block Type:erector spinae block  Injection Technique:single-shot  Needle Type:echogenic  Needle Gauge:22 G (2in)  Resistance on Injection: none    Medications Used: bupivacaine-EPINEPHrine PF (MARCAINE w/EPI) 0.5% -1:[id number] injection - Injection, Erector Spinae   20 mL - 2/17/2025 11:42:00 AM   20 mL - 2/17/2025 11:40:00 AM  dexamethasone (DECADRON) injection 4 mg/mL - Injection   4 mg - 2/17/2025 11:42:00 AM   4 mg - 2/17/2025 11:40:00 AM      Medications  Comment:Precedex 50mcg added to above solution mixture prior to injection    Post Assessment  Injection Assessment: negative  aspiration for heme, no paresthesia on injection and incremental injection  Patient Tolerance:comfortable throughout block  Complications:no  Additional Notes  The block or continuous infusion is requested by the referring physician for management of postoperative pain, or pain related to a procedure. Ultrasound guidance (deemed medically necessary). Painless injection, pt was awake and conversant during the procedure without complications. Needle and surrounding structures visualized throughout procedure. No adverse reactions or complications seen during this period. Post-procedure image showed no signs of complication, and anatomy was consistent with an uncomplicated nerve blockade.  Performed by: Simon Brice MD

## 2025-02-17 NOTE — OP NOTE
OP NOTE  COLON RESECTION LAPAROSCOPIC RIGHT WITH DAVINCI ROBOT  Procedure Report    Patient Name:  Bailey Reese  YOB: 1955  [id number]    Date of Surgery:  2/17/2025     Indications: Cecal mass    Pre-op Diagnosis:   Colonic mass [K63.89]       Post-Op Diagnosis Codes:     * Colonic mass [K63.89]    Procedure/CPT® Codes:  OK LAPAROSCOPY COLECTOMY PARTIAL W/ANASTOMOSIS [70022]    Procedure(s):  COLON RESECTION LAPAROSCOPIC RIGHT WITH DAVINCI ROBOT    Staff:  Surgeon(s):  Nura Smith MD    Assistant: Ciara Oviedo CSA    Anesthesia: General, Local    Estimated Blood Loss: 50 mL    Implants:    Implant Name Type Inv. Item Serial No.  Lot No. LRB No. Used Action   DEV CLS WND VLOC/180 RONALDO ABS 1/2CIR SZ3/0 17MM 23CM GRN - [id number] Implant DEV CLS WND VLOC/180 RONALDO ABS 1/2CIR SZ3/0 17MM 23CM GRN  COVIDIEN [id number] N/A 1 Implanted   RELOAD STPLR SUREFORM 60 DAVINCI/X/XI 6ROW 3.5 MIKAELA 1P/U - [id number] Implant RELOAD STPLR SUREFORM 60 DAVINCI/X/XI 6ROW 3.5 MIKAELA 1P/U  INTUITIVE SURGICAL [id number] N/A 3 Implanted   HEMOST ABS SURGICEL 2X14 - [id number] Implant HEMOST ABS SURGICEL 2X14  [employer]  DIV OF J AND J [id number] N/A 1 Implanted   DEV CLS WND VLOC/180 RONALDO ABS 1/2CIR SZ3/0 17MM 15CM GRN - [id number] Implant DEV CLS WND VLOC/180 RONALDO ABS 1/2CIR SZ3/0 17MM 15CM GRN  COVIDIEN [id number] N/A 1 Implanted       Specimen:          Specimens       ID Source Type Tests Collected By Collected At Frozen?    A Large Intestine, Right / Ascending Colon Tissue TISSUE PATHOLOGY EXAM   Nura Smith MD 2/17/25 1311     Description: RIGHT COLON              Findings: Mass in cecum    Complications: None    Description of Procedure:   Preoperative diagnosis: Cecal mass    Postoperative diagnosis: Same    Findings: 2.5 cm cecal mass    Procedure: Robotic right hemicolectomy    Surgeon: Luis    Anesthesia: General    Assistant: Ciara Oviedo    EBL: 27 mL    Specimens: Right colon    Complications:  None    Indications: 69-year-old female with a right sided cecal colonic mass.  Presents today for hemicolectomy.    PROCEDURE    Patient was seen in the preoperative holding area.  Risks, benefits, alternatives of the operation were again discussed in detail.  All of the patient and family's questions were answered.  They voiced understanding, and agreed to proceed.    Patient was brought to the operating room.  Monitoring devices and Gus stockings were placed.  Anesthesia was administered.  Patient was prepped and draped in standard surgical fashion.  After prepping and draping a timeout was performed to verify both the correct patient and correct procedure.    We began by injecting local anesthesia in the left upper quadrant.  An incision to accommodate a 12 mm trocar was made in the left upper quadrant, and the Veress needle was inserted into the abdomen.  Intra-abdominal placement was verified with a normal saline drop test.  After verification, the abdomen was insufflated to 15 mmHg pressure.  Once the abdomen was insufflated, we removed the Veress needle.  Then using the Visiport technique the abdomen was entered in the left upper quadrant.  Once the abdomen was entered, we reinserted the laparoscope and looked underneath our Veress needle and Visiport entry sites, making sure there was no underlying injury.  We then went about placing our subsequent trocars.  After injection of local anesthesia and under direct visualization, an 8 mm trocar was placed in the left upper, left mid, and left lower abdomen.  A 12 mm trocar was also placed in the left lower abdomen.    We then brought in and docked the robot.  Once the robot was in appropriate position, we began by mobilizing the right colon.  I took down the white line of Toldt and any attachments to the right side of the abdomen.  I then mobilized the terminal ileum to the point it would reach the transverse colon.  We then made a window in the terminal  ileum about 7 cm from the ileocecal valve.  I transected the terminal ileum using the robotic stapler with a blue tissue load.  We then mobilized the terminal ileum taking down the mesentery with the vessel sealer.  We are able to get into the avascular plane between the retroperitoneum and the colonic mesentery.  I dissected medially to laterally taking down this plane.    I encountered the duodenum.  I made sure to sweep the duodenum downward and out of the dissection field.  We then finished taking down any attachments to the right lateral colon wall.  Once the colon was completely mobilized and freed up from the abdominal wall I then found the right branch of the middle colic.  I mobilized the omentum off of the transverse colon and took down attachments to the liver.  I made a window in the transverse colon mesentery here.  I then transected the transverse colon mesentery with the vessel sealer.  And taking down the mesentery we came across what I believed was the right colic.  We did have some bleeding with this.  Vessel sealer did not control the bleeding well for some reason.  There could have been an issue with the vessel sealer itself as it seemed not to be cauterizing as would be expected.  I brought in a second vessel sealer and still had some issues with some continued bleeding and we got a reading that there was no connection at 1 point.  I elected to place some 3-0 Prolene figure-of-eight sutures over this portion of the mesentery to make sure bleeding was controlled.  I placed multiple sutures.  We inspected the operative field and make sure that he was hemostatic.  I suctioned free any blood.    The vessel sealer seem to be functional at this point.  We finished taking down the colonic mesentery with the vessel sealer and had no further issues.  We irrigated the right upper quadrant suction for any spilled blood.  We inspected the mesentery and made sure that there was hemostasis.  We inspected the  duodenum and make sure that there were no obvious injuries to the duodenum and it appeared to be intact without any issues.    Then performed a side-to-side functional end-to-end isoperistaltic anastomosis.  Prior to performing the anastomosis we did inject IC-Green to make sure there was good perfusion to both the ileum and the transverse colon.  Because of the bleeding issue I made sure that there is good perfusion to all the rest of the small bowel as well.  Everything lit up with IC-Green as would be expected.  I also made sure that the small bowel was free and there were no twists or internal hernias prior to performing the anastomosis.  I made a colotomy in the transverse colon as well as a enterotomy and the terminal ileum.  I placed 1 limb of a blue staple load in the colon and 1 in the terminal ileum and fired creating a side-to-side functional end-to-end isoperistaltic anastomosis.  I closed the common enterotomy with a running 3 oh V-Loc suture.  I then imbricated that closure with a running Lembert suture using the 3 oh V-Loc.  We also closed the mesenteric defect with a 3 oh V-Loc.    I talked omentum down over top of the anastomosis.  We suctioned free any spilled blood.  We then placed the specimen in an Endo Catch bag.  The robotic arms were removed and the robot was undocked.  Laparoscopically we removed the specimen.  I did have to extend our 12 mm port site in order to get the specimen out.  We then closed the extraction site with multiple interrupted 0 Vicryl sutures using the Devaughn Mayer device.  We then irrigated the extraction site and closed it with deep interrupted Vicryl sutures.  All skin incisions were closed with subcuticular 4-0 Vicryl sutures.  Skin was dressed with skin affix skin glue.    The patient was then aroused from anesthesia, taken off the OR table, and taken to PACU in stable condition.  Sponge, needle, and instrument counts were correct x2.  Ciara Oviedo was present for  the entire procedure and helped in all portions of the case.    Assistant: Ciara Oviedo CSA was responsible for performing the following activities: Irrigation, Suturing, Closing, Placing Dressing, and Held/Positioned Camera and their skilled assistance was necessary for the success of this case.    Specimen was opened on the back table to verify that we got the entirety of the mass.      The operative note was dictated with the help of the dragon dictation system.      This procedure was not performed to treat colon cancer through resection  There is a mass in the right colon, biopsy has been negative but there is suspicion of a neoplasm.  But there was no definitive cancer diagnosed.       Nura Smith MD     Date: 2/17/2025  Time: 15:41 EST

## 2025-02-18 PROBLEM — Z98.890 S/P ROBOT-ASSISTED SURGICAL PROCEDURE: Status: ACTIVE | Noted: 2025-02-17

## 2025-02-18 LAB
ANION GAP SERPL CALCULATED.3IONS-SCNC: 12.9 MMOL/L (ref 5–15)
BASOPHILS # BLD AUTO: 0.02 10*3/MM3 (ref 0–0.2)
BASOPHILS NFR BLD AUTO: 0.2 % (ref 0–1.5)
BUN SERPL-MCNC: 9 MG/DL (ref 8–23)
BUN/CREAT SERPL: 20.5 (ref 7–25)
CALCIUM SPEC-SCNC: 9.5 MG/DL (ref 8.6–10.5)
CHLORIDE SERPL-SCNC: 99 MMOL/L (ref 98–107)
CO2 SERPL-SCNC: 22.1 MMOL/L (ref 22–29)
CREAT SERPL-MCNC: 0.44 MG/DL (ref 0.57–1)
DEPRECATED RDW RBC AUTO: 45.7 FL (ref 37–54)
EGFRCR SERPLBLD CKD-EPI 2021: 103.6 ML/MIN/1.73
EOSINOPHIL # BLD AUTO: 0 10*3/MM3 (ref 0–0.4)
EOSINOPHIL NFR BLD AUTO: 0 % (ref 0.3–6.2)
ERYTHROCYTE [DISTWIDTH] IN BLOOD BY AUTOMATED COUNT: 13.2 % (ref 12.3–15.4)
GLUCOSE SERPL-MCNC: 173 MG/DL (ref 65–99)
HCT VFR BLD AUTO: 39.2 % (ref 34–46.6)
HGB BLD-MCNC: 12.4 G/DL (ref 12–15.9)
IMM GRANULOCYTES # BLD AUTO: 0.04 10*3/MM3 (ref 0–0.05)
IMM GRANULOCYTES NFR BLD AUTO: 0.3 % (ref 0–0.5)
LYMPHOCYTES # BLD AUTO: 0.74 10*3/MM3 (ref 0.7–3.1)
LYMPHOCYTES NFR BLD AUTO: 6.4 % (ref 19.6–45.3)
MCH RBC QN AUTO: 29.9 PG (ref 26.6–33)
MCHC RBC AUTO-ENTMCNC: 31.6 G/DL (ref 31.5–35.7)
MCV RBC AUTO: 94.5 FL (ref 79–97)
MONOCYTES # BLD AUTO: 0.47 10*3/MM3 (ref 0.1–0.9)
MONOCYTES NFR BLD AUTO: 4 % (ref 5–12)
NEUTROPHILS NFR BLD AUTO: 10.34 10*3/MM3 (ref 1.7–7)
NEUTROPHILS NFR BLD AUTO: 89.1 % (ref 42.7–76)
NRBC BLD AUTO-RTO: 0 /100 WBC (ref 0–0.2)
PLATELET # BLD AUTO: 274 10*3/MM3 (ref 140–450)
PMV BLD AUTO: 9.8 FL (ref 6–12)
POTASSIUM SERPL-SCNC: 3.9 MMOL/L (ref 3.5–5.2)
RBC # BLD AUTO: 4.15 10*6/MM3 (ref 3.77–5.28)
SODIUM SERPL-SCNC: 134 MMOL/L (ref 136–145)
WBC NRBC COR # BLD AUTO: 11.61 10*3/MM3 (ref 3.4–10.8)

## 2025-02-18 PROCEDURE — 94761 N-INVAS EAR/PLS OXIMETRY MLT: CPT

## 2025-02-18 PROCEDURE — 80048 BASIC METABOLIC PNL TOTAL CA: CPT | Performed by: SURGERY

## 2025-02-18 PROCEDURE — 99024 POSTOP FOLLOW-UP VISIT: CPT | Performed by: NURSE PRACTITIONER

## 2025-02-18 PROCEDURE — 97165 OT EVAL LOW COMPLEX 30 MIN: CPT

## 2025-02-18 PROCEDURE — 94799 UNLISTED PULMONARY SVC/PX: CPT

## 2025-02-18 PROCEDURE — 85025 COMPLETE CBC W/AUTO DIFF WBC: CPT | Performed by: SURGERY

## 2025-02-18 PROCEDURE — 97161 PT EVAL LOW COMPLEX 20 MIN: CPT

## 2025-02-18 RX ADMIN — Medication 10 MG: at 20:44

## 2025-02-18 RX ADMIN — PREGABALIN 200 MG: 100 CAPSULE ORAL at 20:44

## 2025-02-18 RX ADMIN — PANTOPRAZOLE SODIUM 40 MG: 40 INJECTION, POWDER, FOR SOLUTION INTRAVENOUS at 05:34

## 2025-02-18 RX ADMIN — LOSARTAN POTASSIUM 100 MG: 50 TABLET, FILM COATED ORAL at 08:28

## 2025-02-18 RX ADMIN — HYDROCODONE BITARTRATE AND ACETAMINOPHEN 1 TABLET: 5; 325 TABLET ORAL at 18:57

## 2025-02-18 RX ADMIN — GABAPENTIN 300 MG: 300 CAPSULE ORAL at 17:13

## 2025-02-18 RX ADMIN — GABAPENTIN 300 MG: 300 CAPSULE ORAL at 08:28

## 2025-02-18 RX ADMIN — HYDROCHLOROTHIAZIDE 25 MG: 25 TABLET ORAL at 08:28

## 2025-02-18 RX ADMIN — HYDROCODONE BITARTRATE AND ACETAMINOPHEN 1 TABLET: 5; 325 TABLET ORAL at 13:20

## 2025-02-18 RX ADMIN — HYDROCODONE BITARTRATE AND ACETAMINOPHEN 1 TABLET: 5; 325 TABLET ORAL at 02:25

## 2025-02-18 RX ADMIN — FLUOXETINE HYDROCHLORIDE 10 MG: 10 CAPSULE ORAL at 08:28

## 2025-02-18 NOTE — PROGRESS NOTES
"POST OP PROGRESS NOTE     Patient Name:  Bailey Reese  YOB: 1955  [id number]   LOS: 1 day   1 Day Post-Op            Subjective     Interval History:     VSS, afebrile, tolerating full liquid diet, pain controlled has been out of bed  Review of Systems:    A complete review of systems was performed and all are negative except what is documented in the HPI.       Objective     Constitutional:  well nourished, no acute distress, appears stated age /58 (BP Location: Left arm, Patient Position: Sitting)   Pulse 73   Temp 97.9 °F (36.6 °C) (Oral)   Resp 16   Ht 160 cm (63\")   Wt 124 kg (274 lb 0.5 oz)   SpO2 93%   BMI 48.54 kg/m²    Eyes:  anicteric sclerae, moist conjunctivae, no lid lag, PERRLA  ENMT:  oropharynx clear, moist mucous membranes  Neck:   full ROM, trachea midline  Cardiovascular: RRR, S1 and S2 present, no MRG, heart rate 73, no pedal edema  Respiratory: lungs CTA, respirations even and unlabored  GI:  Abdomen soft, appropriately tender, nondistended     Skin:  warm and dry, normal turgor, no rashes  Psychiatric:  alert and oriented x 4, intact judgment and insight, cooperative          Results Review:       I reviewed the patient's new clinical results including  CBC, BMP.     WBC   Date Value Ref Range Status   02/18/2025 11.61 (H) 3.40 - 10.80 10*3/mm3 Final     RBC   Date Value Ref Range Status   02/18/2025 4.15 3.77 - 5.28 10*6/mm3 Final     Hemoglobin   Date Value Ref Range Status   02/18/2025 12.4 12.0 - 15.9 g/dL Final     Hematocrit   Date Value Ref Range Status   02/18/2025 39.2 34.0 - 46.6 % Final     MCV   Date Value Ref Range Status   02/18/2025 94.5 79.0 - 97.0 fL Final     MCH   Date Value Ref Range Status   02/18/2025 29.9 26.6 - 33.0 pg Final     MCHC   Date Value Ref Range Status   02/18/2025 31.6 31.5 - 35.7 g/dL Final     RDW   Date Value Ref Range Status   02/18/2025 13.2 12.3 - 15.4 % Final     RDW-SD   Date Value Ref Range Status   02/18/2025 45.7 37.0 - " "54.0 fl Final     MPV   Date Value Ref Range Status   02/18/2025 9.8 6.0 - 12.0 fL Final     Platelets   Date Value Ref Range Status   02/18/2025 274 140 - 450 10*3/mm3 Final     Neutrophil %   Date Value Ref Range Status   02/18/2025 89.1 (H) 42.7 - 76.0 % Final     Lymphocyte %   Date Value Ref Range Status   02/18/2025 6.4 (L) 19.6 - 45.3 % Final     Monocyte %   Date Value Ref Range Status   02/18/2025 4.0 (L) 5.0 - 12.0 % Final     Eosinophil %   Date Value Ref Range Status   02/18/2025 0.0 (L) 0.3 - 6.2 % Final     Basophil %   Date Value Ref Range Status   02/18/2025 0.2 0.0 - 1.5 % Final     Immature Grans %   Date Value Ref Range Status   02/18/2025 0.3 0.0 - 0.5 % Final     Neutrophils, Absolute   Date Value Ref Range Status   02/18/2025 10.34 (H) 1.70 - 7.00 10*3/mm3 Final     Lymphocytes, Absolute   Date Value Ref Range Status   02/18/2025 0.74 0.70 - 3.10 10*3/mm3 Final     Monocytes, Absolute   Date Value Ref Range Status   02/18/2025 0.47 0.10 - 0.90 10*3/mm3 Final     Eosinophils, Absolute   Date Value Ref Range Status   02/18/2025 0.00 0.00 - 0.40 10*3/mm3 Final     Basophils, Absolute   Date Value Ref Range Status   02/18/2025 0.02 0.00 - 0.20 10*3/mm3 Final     Immature Grans, Absolute   Date Value Ref Range Status   02/18/2025 0.04 0.00 - 0.05 10*3/mm3 Final     nRBC   Date Value Ref Range Status   02/18/2025 0.0 0.0 - 0.2 /100 WBC Final         Basic Metabolic Panel    Sodium Sodium   Date Value Ref Range Status   02/18/2025 134 (L) 136 - 145 mmol/L Final      Potassium Potassium   Date Value Ref Range Status   02/18/2025 3.9 3.5 - 5.2 mmol/L Final      Chloride Chloride   Date Value Ref Range Status   02/18/2025 99 98 - 107 mmol/L Final      Bicarbonate No results found for: \"PLASMABICARB\"   BUN BUN   Date Value Ref Range Status   02/18/2025 9 8 - 23 mg/dL Final      Creatinine Creatinine   Date Value Ref Range Status   02/18/2025 0.44 (L) 0.57 - 1.00 mg/dL Final      Calcium Calcium   Date " "Value Ref Range Status   02/18/2025 9.5 8.6 - 10.5 mg/dL Final      Glucose      No components found for: \"GLUCOSE.*\"       Lab Results   Component Value Date    GLUCOSE 173 (H) 02/18/2025    BUN 9 02/18/2025    CREATININE 0.44 (L) 02/18/2025     (L) 02/18/2025    K 3.9 02/18/2025    CL 99 02/18/2025    CALCIUM 9.5 02/18/2025    PROTEINTOT 7.2 02/10/2025    ALBUMIN 4.1 02/10/2025    ALT 21 02/10/2025    AST 19 02/10/2025    ALKPHOS 75 02/10/2025    BILITOT 0.4 02/10/2025    GLOB 3.1 02/10/2025    AGRATIO 1.3 02/10/2025    BCR 20.5 02/18/2025    ANIONGAP 12.9 02/18/2025    EGFR 103.6 02/18/2025       IMAGING:  Imaging Results (Last 72 Hours)       ** No results found for the last 72 hours. **            Medications:    Current Facility-Administered Medications:     FLUoxetine (PROzac) capsule 10 mg, 10 mg, Oral, Daily, Nura Smith MD, 10 mg at 02/18/25 0828    furosemide (LASIX) tablet 20 mg, 20 mg, Oral, Daily PRN, Nura Smith MD    gabapentin (NEURONTIN) capsule 300 mg, 300 mg, Oral, BID, Nura Smith MD, 300 mg at 02/18/25 0828    [START ON 2/19/2025] heparin (porcine) 5000 UNIT/ML injection 5,000 Units, 5,000 Units, Subcutaneous, Q12H, Nura Smith MD    losartan (COZAAR) tablet 100 mg, 100 mg, Oral, Q24H, 100 mg at 02/18/25 0828 **AND** hydroCHLOROthiazide tablet 25 mg, 25 mg, Oral, Q24H, Nura Smith MD, 25 mg at 02/18/25 0828    HYDROcodone-acetaminophen (NORCO) 5-325 MG per tablet 1 tablet, 1 tablet, Oral, Q4H PRN, Nura Smith MD, 1 tablet at 02/18/25 0225    HYDROmorphone (DILAUDID) injection 0.5 mg, 0.5 mg, Intravenous, Q2H PRN **AND** naloxone (NARCAN) injection 0.4 mg, 0.4 mg, Intravenous, Q5 Min PRN, Nura Smith MD    morphine injection 2 mg, 2 mg, Intravenous, Q2H PRN **AND** naloxone (NARCAN) injection 0.4 mg, 0.4 mg, Intravenous, Q5 Min PRN, Nura Smith MD    ondansetron ODT (ZOFRAN-ODT) disintegrating tablet 4 mg, 4 mg, Oral, Q6H PRN **OR** " ondansetron (ZOFRAN) injection 4 mg, 4 mg, Intravenous, Q6H PRN, Nura Smith MD    pantoprazole (PROTONIX) injection 40 mg, 40 mg, Intravenous, Q AM, Nura Smith MD, 40 mg at 02/18/25 0534    pregabalin (LYRICA) capsule 200 mg, 200 mg, Oral, Nightly, Nura Smith MD    Assessment & Plan       Colonic mass    Mass of colon    S/P COLON RESECTION LAPAROSCOPIC RIGHT WITH [employer] ROBOT   Labs in AM   Advance to carb consistent diet   Ambulate in [facility]          Electronically signed by Radha Gonzalez, DAIANA, 02/18/25, 10:43 AM EST.    Seen and agree  Continue ERS protocol

## 2025-02-18 NOTE — PLAN OF CARE
Goal Outcome Evaluation:  Plan of Care Reviewed With: patient        Progress: improving  Outcome Evaluation: Patient ambulating with SBA and walker in room to bathroom through out shift. Ambulated in hallway with therapy today. Pain managed with prn po pain medications. Passing flatus, no BM this shift. VSS. Tolerating diet well, no N&V.
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
## 2025-02-18 NOTE — PLAN OF CARE
Goal Outcome Evaluation:  Plan of Care Reviewed With: patient           Outcome Evaluation: Pt is A&O X 4, on room air, and X 1 assist. All scheduled medications given as ordered and pain managed with PRN pain medication. Safety checks maintained throughout shift with pt resting between bed and chair. Wheels to bed and chair locked, bed in lowest position, and personal items and call light within reach. VSS.

## 2025-02-18 NOTE — PLAN OF CARE
Goal Outcome Evaluation:  Plan of Care Reviewed With: patient        Progress: no change  Outcome Evaluation: Patient presents with limitations in self-care, functional transfers, balance, and endurance. She would benefit from continued skilled occupational therapy services to maximize independence with ADLs/functional transfers.    Anticipated Discharge Disposition (OT): home with assist

## 2025-02-18 NOTE — PLAN OF CARE
Goal Outcome Evaluation:  Plan of Care Reviewed With: patient        Progress: no change  Outcome Evaluation: Patient does not present with any significant decline in functional mobility requiring inpatient PT services. She is safe to continue transferring and ambulating in the hallway with the supervision of staff until her discharge from the hospital. She will be discharged from PT caseload at this time.    Anticipated Discharge Disposition (PT): home with assist
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
## 2025-02-19 ENCOUNTER — READMISSION MANAGEMENT (OUTPATIENT)
Dept: CALL CENTER | Facility: HOSPITAL | Age: 70
End: 2025-02-19
Payer: MEDICARE

## 2025-02-19 VITALS
OXYGEN SATURATION: 92 % | HEIGHT: 63 IN | WEIGHT: 274.03 LBS | DIASTOLIC BLOOD PRESSURE: 70 MMHG | RESPIRATION RATE: 16 BRPM | TEMPERATURE: 97.9 F | BODY MASS INDEX: 48.55 KG/M2 | SYSTOLIC BLOOD PRESSURE: 122 MMHG | HEART RATE: 71 BPM

## 2025-02-19 LAB
ANION GAP SERPL CALCULATED.3IONS-SCNC: 7.5 MMOL/L (ref 5–15)
BASOPHILS # BLD AUTO: 0.02 10*3/MM3 (ref 0–0.2)
BASOPHILS NFR BLD AUTO: 0.2 % (ref 0–1.5)
BUN SERPL-MCNC: 19 MG/DL (ref 8–23)
BUN/CREAT SERPL: 45.2 (ref 7–25)
CALCIUM SPEC-SCNC: 8.4 MG/DL (ref 8.6–10.5)
CHLORIDE SERPL-SCNC: 101 MMOL/L (ref 98–107)
CO2 SERPL-SCNC: 26.5 MMOL/L (ref 22–29)
CREAT SERPL-MCNC: 0.42 MG/DL (ref 0.57–1)
DEPRECATED RDW RBC AUTO: 48.2 FL (ref 37–54)
DEPRECATED RDW RBC AUTO: 48.6 FL (ref 37–54)
EGFRCR SERPLBLD CKD-EPI 2021: 104.7 ML/MIN/1.73
EOSINOPHIL # BLD AUTO: 0 10*3/MM3 (ref 0–0.4)
EOSINOPHIL NFR BLD AUTO: 0 % (ref 0.3–6.2)
ERYTHROCYTE [DISTWIDTH] IN BLOOD BY AUTOMATED COUNT: 13.6 % (ref 12.3–15.4)
ERYTHROCYTE [DISTWIDTH] IN BLOOD BY AUTOMATED COUNT: 13.7 % (ref 12.3–15.4)
GLUCOSE SERPL-MCNC: 137 MG/DL (ref 65–99)
HCT VFR BLD AUTO: 35.7 % (ref 34–46.6)
HCT VFR BLD AUTO: 37.1 % (ref 34–46.6)
HGB BLD-MCNC: 11 G/DL (ref 12–15.9)
HGB BLD-MCNC: 12 G/DL (ref 12–15.9)
IMM GRANULOCYTES # BLD AUTO: 0.07 10*3/MM3 (ref 0–0.05)
IMM GRANULOCYTES NFR BLD AUTO: 0.5 % (ref 0–0.5)
LYMPHOCYTES # BLD AUTO: 1.48 10*3/MM3 (ref 0.7–3.1)
LYMPHOCYTES NFR BLD AUTO: 11.2 % (ref 19.6–45.3)
MCH RBC QN AUTO: 30.1 PG (ref 26.6–33)
MCH RBC QN AUTO: 31.4 PG (ref 26.6–33)
MCHC RBC AUTO-ENTMCNC: 30.8 G/DL (ref 31.5–35.7)
MCHC RBC AUTO-ENTMCNC: 32.3 G/DL (ref 31.5–35.7)
MCV RBC AUTO: 97.1 FL (ref 79–97)
MCV RBC AUTO: 97.5 FL (ref 79–97)
MONOCYTES # BLD AUTO: 0.88 10*3/MM3 (ref 0.1–0.9)
MONOCYTES NFR BLD AUTO: 6.7 % (ref 5–12)
NEUTROPHILS NFR BLD AUTO: 10.73 10*3/MM3 (ref 1.7–7)
NEUTROPHILS NFR BLD AUTO: 81.4 % (ref 42.7–76)
NRBC BLD AUTO-RTO: 0 /100 WBC (ref 0–0.2)
PLATELET # BLD AUTO: 248 10*3/MM3 (ref 140–450)
PLATELET # BLD AUTO: 268 10*3/MM3 (ref 140–450)
PMV BLD AUTO: 10.2 FL (ref 6–12)
PMV BLD AUTO: 10.4 FL (ref 6–12)
POTASSIUM SERPL-SCNC: 3.7 MMOL/L (ref 3.5–5.2)
RBC # BLD AUTO: 3.66 10*6/MM3 (ref 3.77–5.28)
RBC # BLD AUTO: 3.82 10*6/MM3 (ref 3.77–5.28)
SODIUM SERPL-SCNC: 135 MMOL/L (ref 136–145)
WBC NRBC COR # BLD AUTO: 12.64 10*3/MM3 (ref 3.4–10.8)
WBC NRBC COR # BLD AUTO: 13.18 10*3/MM3 (ref 3.4–10.8)

## 2025-02-19 PROCEDURE — 94799 UNLISTED PULMONARY SVC/PX: CPT

## 2025-02-19 PROCEDURE — 25010000002 HEPARIN (PORCINE) PER 1000 UNITS: Performed by: SURGERY

## 2025-02-19 PROCEDURE — 99024 POSTOP FOLLOW-UP VISIT: CPT | Performed by: NURSE PRACTITIONER

## 2025-02-19 PROCEDURE — 85027 COMPLETE CBC AUTOMATED: CPT | Performed by: NURSE PRACTITIONER

## 2025-02-19 PROCEDURE — 85025 COMPLETE CBC W/AUTO DIFF WBC: CPT | Performed by: NURSE PRACTITIONER

## 2025-02-19 PROCEDURE — 80048 BASIC METABOLIC PNL TOTAL CA: CPT | Performed by: NURSE PRACTITIONER

## 2025-02-19 RX ORDER — POLYETHYLENE GLYCOL 3350 17 G/17G
17 POWDER, FOR SOLUTION ORAL DAILY
Qty: 7 PACKET | Refills: 0 | Status: SHIPPED | OUTPATIENT
Start: 2025-02-19

## 2025-02-19 RX ORDER — HYDROCODONE BITARTRATE AND ACETAMINOPHEN 5; 325 MG/1; MG/1
1 TABLET ORAL EVERY 4 HOURS PRN
Qty: 18 TABLET | Refills: 0 | Status: SHIPPED | OUTPATIENT
Start: 2025-02-19 | End: 2025-02-22

## 2025-02-19 RX ADMIN — HYDROCODONE BITARTRATE AND ACETAMINOPHEN 1 TABLET: 5; 325 TABLET ORAL at 08:46

## 2025-02-19 RX ADMIN — GABAPENTIN 300 MG: 300 CAPSULE ORAL at 08:46

## 2025-02-19 RX ADMIN — HEPARIN SODIUM 5000 UNITS: 5000 INJECTION INTRAVENOUS; SUBCUTANEOUS at 08:46

## 2025-02-19 RX ADMIN — HYDROCODONE BITARTRATE AND ACETAMINOPHEN 1 TABLET: 5; 325 TABLET ORAL at 12:35

## 2025-02-19 RX ADMIN — FLUOXETINE HYDROCHLORIDE 10 MG: 10 CAPSULE ORAL at 08:46

## 2025-02-19 RX ADMIN — HYDROCODONE BITARTRATE AND ACETAMINOPHEN 1 TABLET: 5; 325 TABLET ORAL at 02:33

## 2025-02-19 RX ADMIN — LOSARTAN POTASSIUM 100 MG: 50 TABLET, FILM COATED ORAL at 08:46

## 2025-02-19 RX ADMIN — HYDROCHLOROTHIAZIDE 25 MG: 25 TABLET ORAL at 08:46

## 2025-02-19 RX ADMIN — PANTOPRAZOLE SODIUM 40 MG: 40 INJECTION, POWDER, FOR SOLUTION INTRAVENOUS at 05:53

## 2025-02-19 NOTE — SIGNIFICANT NOTE
02/19/25 1536   OTHER   Discipline occupational therapist   Rehab Time/Intention   Session Not Performed other (see comments)  (pending d/c)

## 2025-02-19 NOTE — PLAN OF CARE
Goal Outcome Evaluation:  Plan of Care Reviewed With: patient        Progress: improving  Outcome Evaluation: A/Ox4, able to make needs known. Up with standby assist with walker to bathroom, ambulating in hallway. BM this shift. Lap sites clean, dry, intact. Pain controlled with PRN PO pain medication. VSS.

## 2025-02-19 NOTE — PROGRESS NOTES
"POST OP PROGRESS NOTE     Patient Name:  Bailey Reese  YOB: 1955  [id number]   LOS: 2 days   2 Days Post-Op            Subjective     Interval History:     VSS, afebrile, pain controlled, tolerating diet, +flatus and BM, WBC up slightly today.  Patient reports she would really like to go home.   Review of Systems:    A complete review of systems was performed and all are negative except what is documented in the HPI.       Objective     Constitutional:  well nourished, no acute distress, appears stated age /68 (BP Location: Right arm, Patient Position: Lying)   Pulse 71   Temp 97.9 °F (36.6 °C) (Oral)   Resp 16   Ht 160 cm (63\")   Wt 124 kg (274 lb 0.5 oz)   SpO2 92%   BMI 48.54 kg/m²    Eyes:  anicteric sclerae, moist conjunctivae, no lid lag, PERRLA  ENMT:  oropharynx clear, moist mucous membranes  Neck:   full ROM, trachea midline  Cardiovascular: RRR, S1 and S2 present, no MRG, heart rate 71, no pedal edema  Respiratory: lungs CTA, respirations even and unlabored  GI:  Abdomen soft, appropriately tender, nondistended     Skin:  warm and dry, normal turgor, no rashes  Psychiatric:  alert and oriented x 4, intact judgment and insight, cooperative          Results Review:       I reviewed the patient's new clinical results including  CBC, BMP.     WBC   Date Value Ref Range Status   02/19/2025 13.18 (H) 3.40 - 10.80 10*3/mm3 Final     RBC   Date Value Ref Range Status   02/19/2025 3.66 (L) 3.77 - 5.28 10*6/mm3 Final     Hemoglobin   Date Value Ref Range Status   02/19/2025 11.0 (L) 12.0 - 15.9 g/dL Final     Hematocrit   Date Value Ref Range Status   02/19/2025 35.7 34.0 - 46.6 % Final     MCV   Date Value Ref Range Status   02/19/2025 97.5 (H) 79.0 - 97.0 fL Final     MCH   Date Value Ref Range Status   02/19/2025 30.1 26.6 - 33.0 pg Final     MCHC   Date Value Ref Range Status   02/19/2025 30.8 (L) 31.5 - 35.7 g/dL Final     RDW   Date Value Ref Range Status   02/19/2025 13.6 12.3 - " "15.4 % Final     RDW-SD   Date Value Ref Range Status   02/19/2025 48.2 37.0 - 54.0 fl Final     MPV   Date Value Ref Range Status   02/19/2025 10.4 6.0 - 12.0 fL Final     Platelets   Date Value Ref Range Status   02/19/2025 248 140 - 450 10*3/mm3 Final     Neutrophil %   Date Value Ref Range Status   02/19/2025 81.4 (H) 42.7 - 76.0 % Final     Lymphocyte %   Date Value Ref Range Status   02/19/2025 11.2 (L) 19.6 - 45.3 % Final     Monocyte %   Date Value Ref Range Status   02/19/2025 6.7 5.0 - 12.0 % Final     Eosinophil %   Date Value Ref Range Status   02/19/2025 0.0 (L) 0.3 - 6.2 % Final     Basophil %   Date Value Ref Range Status   02/19/2025 0.2 0.0 - 1.5 % Final     Immature Grans %   Date Value Ref Range Status   02/19/2025 0.5 0.0 - 0.5 % Final     Neutrophils, Absolute   Date Value Ref Range Status   02/19/2025 10.73 (H) 1.70 - 7.00 10*3/mm3 Final     Lymphocytes, Absolute   Date Value Ref Range Status   02/19/2025 1.48 0.70 - 3.10 10*3/mm3 Final     Monocytes, Absolute   Date Value Ref Range Status   02/19/2025 0.88 0.10 - 0.90 10*3/mm3 Final     Eosinophils, Absolute   Date Value Ref Range Status   02/19/2025 0.00 0.00 - 0.40 10*3/mm3 Final     Basophils, Absolute   Date Value Ref Range Status   02/19/2025 0.02 0.00 - 0.20 10*3/mm3 Final     Immature Grans, Absolute   Date Value Ref Range Status   02/19/2025 0.07 (H) 0.00 - 0.05 10*3/mm3 Final     nRBC   Date Value Ref Range Status   02/19/2025 0.0 0.0 - 0.2 /100 WBC Final         Basic Metabolic Panel    Sodium Sodium   Date Value Ref Range Status   02/19/2025 135 (L) 136 - 145 mmol/L Final   02/18/2025 134 (L) 136 - 145 mmol/L Final      Potassium Potassium   Date Value Ref Range Status   02/19/2025 3.7 3.5 - 5.2 mmol/L Final   02/18/2025 3.9 3.5 - 5.2 mmol/L Final      Chloride Chloride   Date Value Ref Range Status   02/19/2025 101 98 - 107 mmol/L Final   02/18/2025 99 98 - 107 mmol/L Final      Bicarbonate No results found for: \"PLASMABICARB\" " "  BUN BUN   Date Value Ref Range Status   02/19/2025 19 8 - 23 mg/dL Final   02/18/2025 9 8 - 23 mg/dL Final      Creatinine Creatinine   Date Value Ref Range Status   02/19/2025 0.42 (L) 0.57 - 1.00 mg/dL Final   02/18/2025 0.44 (L) 0.57 - 1.00 mg/dL Final      Calcium Calcium   Date Value Ref Range Status   02/19/2025 8.4 (L) 8.6 - 10.5 mg/dL Final   02/18/2025 9.5 8.6 - 10.5 mg/dL Final      Glucose      No components found for: \"GLUCOSE.*\"       Lab Results   Component Value Date    GLUCOSE 137 (H) 02/19/2025    BUN 19 02/19/2025    CREATININE 0.42 (L) 02/19/2025     (L) 02/19/2025    K 3.7 02/19/2025     02/19/2025    CALCIUM 8.4 (L) 02/19/2025    PROTEINTOT 7.2 02/10/2025    ALBUMIN 4.1 02/10/2025    ALT 21 02/10/2025    AST 19 02/10/2025    ALKPHOS 75 02/10/2025    BILITOT 0.4 02/10/2025    GLOB 3.1 02/10/2025    AGRATIO 1.3 02/10/2025    BCR 45.2 (H) 02/19/2025    ANIONGAP 7.5 02/19/2025    EGFR 104.7 02/19/2025       IMAGING:  Imaging Results (Last 72 Hours)       ** No results found for the last 72 hours. **            Medications:    Current Facility-Administered Medications:     FLUoxetine (PROzac) capsule 10 mg, 10 mg, Oral, Daily, Nura Smith MD, 10 mg at 02/19/25 0846    furosemide (LASIX) tablet 20 mg, 20 mg, Oral, Daily PRN, Nura Smith MD    gabapentin (NEURONTIN) capsule 300 mg, 300 mg, Oral, BID, Nura Smith MD, 300 mg at 02/19/25 0846    heparin (porcine) 5000 UNIT/ML injection 5,000 Units, 5,000 Units, Subcutaneous, Q12H, Nura Smith MD, 5,000 Units at 02/19/25 0846    losartan (COZAAR) tablet 100 mg, 100 mg, Oral, Q24H, 100 mg at 02/19/25 0846 **AND** hydroCHLOROthiazide tablet 25 mg, 25 mg, Oral, Q24H, Nura Smith MD, 25 mg at 02/19/25 0846    HYDROcodone-acetaminophen (NORCO) 5-325 MG per tablet 1 tablet, 1 tablet, Oral, Q4H PRN, Nura Smith MD, 1 tablet at 02/19/25 0846    HYDROmorphone (DILAUDID) injection 0.5 mg, 0.5 mg, Intravenous, " Q2H PRN **AND** naloxone (NARCAN) injection 0.4 mg, 0.4 mg, Intravenous, Q5 Min PRN, Nura Smith MD    melatonin tablet 10 mg, 10 mg, Oral, Nightly PRN, Nura Smith MD, 10 mg at 02/18/25 2044    morphine injection 2 mg, 2 mg, Intravenous, Q2H PRN **AND** naloxone (NARCAN) injection 0.4 mg, 0.4 mg, Intravenous, Q5 Min PRN, Nura Smith MD    ondansetron ODT (ZOFRAN-ODT) disintegrating tablet 4 mg, 4 mg, Oral, Q6H PRN **OR** ondansetron (ZOFRAN) injection 4 mg, 4 mg, Intravenous, Q6H PRN, Nura Smith MD    pantoprazole (PROTONIX) injection 40 mg, 40 mg, Intravenous, Q AM, Nura Smith MD, 40 mg at 02/19/25 0553    pregabalin (LYRICA) capsule 200 mg, 200 mg, Oral, Nightly, Nura Smith MD, 200 mg at 02/18/25 2044    Assessment & Plan       Colonic mass    Mass of colon    S/P COLON RESECTION LAPAROSCOPIC RIGHT WITH DAVINCI ROBOT   Repeat CBC at 1400   If WBC count down then will DC home later today          Electronically signed by Radha Gonzalez, DAIANA, 02/19/25, 10:53 AM EST.    Seen and agree

## 2025-02-20 LAB
CYTO UR: NORMAL
LAB AP CASE REPORT: NORMAL
LAB AP CLINICAL INFORMATION: NORMAL
LAB AP SPECIAL STAINS: NORMAL
PATH REPORT.FINAL DX SPEC: NORMAL
PATH REPORT.GROSS SPEC: NORMAL

## 2025-02-20 NOTE — OUTREACH NOTE
Prep Survey      Flowsheet Row Responses   [facility] facility patient discharged from? [facility]   Is LACE score < 7 ? Yes   Eligibility Not Eligible   What are the reasons patient is not eligible? Other  [low risk for readmit]   Does the patient have one of the following disease processes/diagnoses(primary or secondary)? Other   Prep survey completed? Yes            Leanna AMADOR - Registered Nurse

## 2025-02-20 NOTE — CASE MANAGEMENT/SOCIAL WORK
< 3MN  Operative Status Criteria: Inpatient      2/17/2025  4:13 PM          -- 2/17/2025  4:13 PM by Monica Marc RN --              COLON RESECTION LAPAROSCOPIC RIGHT WITH DAVINCI ROBOT

## 2025-03-03 ENCOUNTER — TELEPHONE (OUTPATIENT)
Dept: SURGERY | Facility: CLINIC | Age: 70
End: 2025-03-03
Payer: MEDICARE

## 2025-03-03 RX ORDER — SULFAMETHOXAZOLE AND TRIMETHOPRIM 800; 160 MG/1; MG/1
1 TABLET ORAL 2 TIMES DAILY
Qty: 14 TABLET | Refills: 0 | Status: SHIPPED | OUTPATIENT
Start: 2025-03-03 | End: 2025-03-06 | Stop reason: SDUPTHER

## 2025-03-03 NOTE — TELEPHONE ENCOUNTER
SPOKE WITH THE PATIENT AND LET HER KNOW THAT DR GRACE WAS IN SURGERY TODAY BUT THAT I SENT HIM A MESSAGE.

## 2025-03-03 NOTE — TELEPHONE ENCOUNTER
Pt had colon resection on 2/17. She has concerns about one area on the lower abdomen that is a little red around it, does not feel hot, is not open. She has been cleaning it with alcohol. She will try to send a picture on My Chart. Her f/u appt is Thursday and she just doesn't want infection in the wound.

## 2025-03-06 ENCOUNTER — TELEPHONE (OUTPATIENT)
Dept: SURGERY | Facility: CLINIC | Age: 70
End: 2025-03-06

## 2025-03-06 ENCOUNTER — OFFICE VISIT (OUTPATIENT)
Dept: SURGERY | Facility: CLINIC | Age: 70
End: 2025-03-06
Payer: MEDICARE

## 2025-03-06 VITALS — BODY MASS INDEX: 46.74 KG/M2 | WEIGHT: 263.8 LBS | HEIGHT: 63 IN

## 2025-03-06 DIAGNOSIS — K63.89 MASS OF COLON: Primary | ICD-10-CM

## 2025-03-06 PROCEDURE — 1160F RVW MEDS BY RX/DR IN RCRD: CPT | Performed by: SURGERY

## 2025-03-06 PROCEDURE — 1159F MED LIST DOCD IN RCRD: CPT | Performed by: SURGERY

## 2025-03-06 PROCEDURE — 99024 POSTOP FOLLOW-UP VISIT: CPT | Performed by: SURGERY

## 2025-03-06 RX ORDER — FLUCONAZOLE 100 MG/1
100 TABLET ORAL DAILY
Qty: 3 TABLET | Refills: 1 | Status: SHIPPED | OUTPATIENT
Start: 2025-03-06 | End: 2025-03-12

## 2025-03-06 RX ORDER — ONDANSETRON 4 MG/1
4 TABLET, FILM COATED ORAL EVERY 8 HOURS PRN
COMMUNITY
Start: 2025-02-27

## 2025-03-06 RX ORDER — SULFAMETHOXAZOLE AND TRIMETHOPRIM 800; 160 MG/1; MG/1
1 TABLET ORAL 2 TIMES DAILY
Qty: 14 TABLET | Refills: 0 | Status: SHIPPED | OUTPATIENT
Start: 2025-03-06

## 2025-03-06 NOTE — TELEPHONE ENCOUNTER
Hub staff attempted to follow warm transfer process and was unsuccessful     Caller: LACHO     Relationship to patient: [employer]     Best call back number: [phone]    Patient is needing:  WANTED TO VERIFY INSTRUCTIONS ON PRESCRIPTION FLUCONAZOLE
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
## 2025-03-10 ENCOUNTER — TELEPHONE (OUTPATIENT)
Dept: SURGERY | Facility: CLINIC | Age: 70
End: 2025-03-10
Payer: MEDICARE

## 2025-03-10 NOTE — TELEPHONE ENCOUNTER
"Per Dr. Smith-     \"Yes she can use the soap or alcohol. I just don't want the incision to stay wet. The lotion or cream tend to keep the incisions wet\"      Patient was informed of Dr. Smith's response and v/u.   "

## 2025-03-10 NOTE — TELEPHONE ENCOUNTER
"Per Dr. Smith-     \"I'm glad it is less red. I'm fine with her trying the Keflex. I don't want her to use the ointment. If it is draining, that is actually good. She does not have to come in if it is getting better but I'll see her if she wants\"    Relayed this message to the patient and she states that she will wait a couple of days to see how it does and see if she needs to be seen. She is also wanting to know if she can use the soap we gave her before surgery for the area or if she should just continue to let water run over it. She states that she was also using a cotton ball with alcohol on it and dabbing the area as well.     Message was sent to Dr. Smith. Awaiting response.       "

## 2025-03-10 NOTE — TELEPHONE ENCOUNTER
PATIENT HAD COLON RESECTION ON 02/17/25 AND HAD HER P/O 03/06/25.    SHE SAID THERE WAS A PLACE THAT WAS RED THAT DR. GRACE WANTED HER TO LET HIM KNOW ABOUT TODAY.  SHE SAID IT IS AT THE END OF THE LARGE INCISION.  SHE SAID HE TOLD HER HE MIGHT HAVE TO OPEN IT UP TO DRAIN.    SATURDAY, IT OPENED AND STARTED OOZING.  SHE KEPT IT CLEAN, DABBING ALCOHOL ON IT, AND KEPT IT BANDAGED.      SHE SAW HER PCP TODAY, AND THE PA LOOKED AT IS AND SAID IT WASN'T AS RED.  SHE PUSHED ON IT TO SEE IF ANYTHING WOULD COME OUT OF IT.  SHE GAVE HER A PRESCRIPTION FOR KEFLEX 500 MG AND BACTROBAN OINTMENT.  SHE TOLD HER TO CLEAN IT WITH ANTIBACTERIAL SOAP.    PATIENT SAID SHE JUST PICKED UP A PRESCRIPTION OF BACTRIM THIS MORNING THAT DR. GRACE HAS HAD HER ON.    DOES DR. GRACE WANT HER TO TAKE THE ANTIBIOTIC THE PCP PRESCRIBED FOR A COUPLE DAYS, AND SEE HOW IT DOES, OR DOES HE WANT HER TO COME IN TO BE SEEN?    #[phone]

## 2025-03-12 NOTE — TELEPHONE ENCOUNTER
PATIENT CALLED AND SAID SHE STILL HAS A BLOODY OOZE ON THE END OF HER SURGERY SITE WHERE IT OPENED UP.  IT HAS BEEN OOZING SINCE SATURDAY.  IT IS NOT AS RED.  SHE IS BANDAGING IT.  SHE DOESN'T THINK IT LOOKS INFECTED.    SHE HAS BEEN TAKING 2 ANTIBIOTICS;THE BACTRIM THAT DR. GRACE PRESCRIBED, AND THE KEFLEX THAT HER PCP PRESCRIBED.      SHE THINKS THAT DR. GRACE NEEDS TO LOOK AT THE SITE, SINCE IT IS STILL OOZING.     #[phone]

## 2025-03-12 NOTE — TELEPHONE ENCOUNTER
PER DR GRACE HE WILL SEE THE PATIENT ON THURSDAY 3/13/25. SPOKE WITH THE PATIENT AND SHE IS COMING IN AT 9:30 AM ON 3/13/25.

## 2025-03-13 ENCOUNTER — OFFICE VISIT (OUTPATIENT)
Dept: SURGERY | Facility: CLINIC | Age: 70
End: 2025-03-13
Payer: MEDICARE

## 2025-03-13 VITALS
BODY MASS INDEX: 46.6 KG/M2 | DIASTOLIC BLOOD PRESSURE: 78 MMHG | HEART RATE: 78 BPM | HEIGHT: 63 IN | WEIGHT: 263 LBS | SYSTOLIC BLOOD PRESSURE: 130 MMHG

## 2025-03-13 DIAGNOSIS — Z98.890 S/P ROBOT-ASSISTED SURGICAL PROCEDURE: Primary | ICD-10-CM

## 2025-03-13 RX ORDER — MUPIROCIN 20 MG/G
OINTMENT TOPICAL
COMMUNITY
Start: 2025-03-10

## 2025-03-13 RX ORDER — CEPHALEXIN 500 MG/1
1 CAPSULE ORAL EVERY 12 HOURS SCHEDULED
COMMUNITY
Start: 2025-03-10
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
## 2025-03-27 RX ORDER — TIRZEPATIDE 10 MG/.5ML
INJECTION, SOLUTION SUBCUTANEOUS
Qty: 2 ML | Refills: 3 | Status: SHIPPED | OUTPATIENT
Start: 2025-03-27
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
## 2025-04-04 ENCOUNTER — OFFICE VISIT (OUTPATIENT)
Dept: DIABETES SERVICES | Facility: HOSPITAL | Age: 70
End: 2025-04-04
Payer: MEDICARE

## 2025-04-04 VITALS
SYSTOLIC BLOOD PRESSURE: 118 MMHG | OXYGEN SATURATION: 99 % | WEIGHT: 266 LBS | HEART RATE: 75 BPM | BODY MASS INDEX: 47.12 KG/M2 | DIASTOLIC BLOOD PRESSURE: 87 MMHG

## 2025-04-04 DIAGNOSIS — E11.42 CONTROLLED TYPE 2 DIABETES MELLITUS WITH DIABETIC POLYNEUROPATHY, WITH LONG-TERM CURRENT USE OF INSULIN: Primary | ICD-10-CM

## 2025-04-04 DIAGNOSIS — Z79.4 CONTROLLED TYPE 2 DIABETES MELLITUS WITH DIABETIC POLYNEUROPATHY, WITH LONG-TERM CURRENT USE OF INSULIN: Primary | ICD-10-CM

## 2025-04-04 DIAGNOSIS — E66.01 SEVERE OBESITY (BMI >= 40): ICD-10-CM

## 2025-04-04 LAB — GLUCOSE BLDC GLUCOMTR-MCNC: 100 MG/DL (ref 70–99)

## 2025-04-04 PROCEDURE — 82948 REAGENT STRIP/BLOOD GLUCOSE: CPT | Performed by: NURSE PRACTITIONER

## 2025-04-04 PROCEDURE — G0463 HOSPITAL OUTPT CLINIC VISIT: HCPCS | Performed by: NURSE PRACTITIONER

## 2025-04-21 RX ORDER — TIRZEPATIDE 10 MG/.5ML
INJECTION, SOLUTION SUBCUTANEOUS
Qty: 2 ML | Refills: 5 | Status: SHIPPED | OUTPATIENT
Start: 2025-04-21

## 2025-04-22 NOTE — DISCHARGE SUMMARY
[facility]         DISCHARGE SUMMARY    Patient Name: Bailey Reese  : 1955  MRN: [id number]    Date of Admission: 2025  Date of Discharge:  2025  Primary Care Physician: Fátima Aiken APRN    Consults       No orders found from 2025 to 2025.            Surgical Procedures Since Admission:  Procedure(s):  COLON RESECTION LAPAROSCOPIC RIGHT WITH DAVINCI ROBOT  Surgeon:  Nura Smith MD  Status:  5 Days Post-Op  -------------------      Presenting Problem:   Colonic mass [K63.89]  Mass of colon [K63.89]    Active and Resolved Hospital Problems:  Active Hospital Problems    Diagnosis POA    **Colonic mass [K63.89] Unknown    S/P COLON RESECTION LAPAROSCOPIC RIGHT WITH DAVINCI ROBOT [Z98.890] Not Applicable    Mass of colon [K63.89] Yes      Resolved Hospital Problems   No resolved problems to display.         Hospital Course     Hospital Course:  Bailey Reese is a 69 y.o. female admitted s/p robotic right colon resection. Pt did well post op. She underwent ERAS protocol. Her diet was slowly advanced. On POD 2 she was having bowel movements, pain was controlled and she felt well clinically. She had a mild leukocytosis that improved day of DC. She was DC home in stable condition.     Day of Discharge     Vital Signs:     Output by Drain (mL) 25 0701 - 25 1900 25 1901 - 25 0700 25 0701 - 25 1129 Range Total   Patient has no LDAs of requested type attached.     Physical Exam:  See Progress Note    Pertinent  and/or Most Recent Results     LAB RESULTS:      Lab 25  1416 25  0514 25  0618   WBC 12.64* 13.18* 11.61*   HEMOGLOBIN 12.0 11.0* 12.4   HEMATOCRIT 37.1 35.7 39.2   PLATELETS 268 248 274   NEUTROS ABS  --  10.73* 10.34*   IMMATURE GRANS (ABS)  --  0.07* 0.04   LYMPHS ABS  --  1.48 0.74   MONOS ABS  --  0.88 0.47   EOS ABS  --  0.00 0.00   MCV 97.1* 97.5* 94.5         Lab 25  0514 25  0618   SODIUM      PATIENT:               CHRIST HERNADEZ  ACCT #:                  [id number]  MRN:                       [id number]  :                       1981  ADMIT DATE:       2025 9:58 AM  DISCH DATE:        2025 7:45 PM  RESPONDING PROVIDER #:        [id number]          PROVIDER RESPONSE TEXT:    Dehydration 2/2 nausea/vomiting    CDI QUERY TEXT:    Clarification      Instruction:    Based on your assessment of the patient and the clinical information, please provide the requested documentation by clicking on the appropriate radio button and enter any additional information if prompted.    Question: Please further clarify the most likely etiology of nausea and vomiting on admission after final work up    When answering this query, please exercise your independent professional judgment. The fact that a question is being asked, does not imply that any particular answer is desired or expected.    The patient's clinical indicators include:  Clinical Information: Patient presented to the ED 2/2 nausea and vomiting    Clinical Indicators:    -ED vitals: Temp-98.6, HR-136, BP-186/121, RR-18, SpO2-98 percent on RA    -ED MDM section: CBC is remarkable for leukocytosis 27.5 and platelets 739. I suspect this is likely related to the patient's hypovolemia. I suspect her tachycardia related to hypovolemia in the setting of multiple days of nausea/vomiting. She will be given 1 L of lactated Ringer's.    - 25-25 Labs:  Bicarb-15, Creatinine-1.09, 1.15, 1.32,  BUN- 25, 25, 24, WBC-27, 12, 11    Treatment: sodium chloride 0.9 percent infusion-25-25, lactated Ringer's bolus 1,000 mL-25    Risk Factors: abnormal vitals, labs  Options provided:  -- Hypovolemia 2/2 nausea/vomiting  -- Dehydration 2/2 nausea/vomiting  -- Acute metabolic acidosis 2/2 nausea/vomiting  -- Patient with hypovolemia, dehydration and acute metabolic acidosis 2/2 nausea/vomiting  -- Other - I will add my own diagnosis  -- Refer to  135* 134*   POTASSIUM 3.7 3.9   CHLORIDE 101 99   CO2 26.5 22.1   ANION GAP 7.5 12.9   BUN 19 9   CREATININE 0.42* 0.44*   EGFR 104.7 103.6   GLUCOSE 137* 173*   CALCIUM 8.4* 9.5               Lab 02/17/25  1059   ABO TYPING A   RH TYPING Negative   ANTIBODY SCREEN Negative       Brief Urine Lab Results  (Last result in the past 365 days)        Color   Clarity   Blood   Leuk Est   Nitrite   Protein   CREAT   Urine HCG        08/30/24 1616             47.3               Microbiology Results (last 10 days)       ** No results found for the last 240 hours. **         Results for orders placed during the hospital encounter of 01/24/25    Adult Transthoracic Echo Complete W/ Cont if Necessary Per Protocol    Interpretation Summary    Left ventricular systolic function is normal. Estimated left ventricular EF = 55%    Left ventricular diastolic function was normal.      Labs Pending at Discharge:      Discharge Details        Discharge Medications        New Medications        Instructions Start Date   amoxicillin-clavulanate 875-125 MG per tablet  Commonly known as: AUGMENTIN   1 tablet, Oral, 2 Times Daily      HYDROcodone-acetaminophen 5-325 MG per tablet  Commonly known as: NORCO   1 tablet, Oral, Every 4 Hours PRN      polyethylene glycol 17 g packet  Commonly known as: MIRALAX   17 g, Oral, Daily             Continue These Medications        Instructions Start Date   FLUoxetine 10 MG capsule  Commonly known as: PROzac   10 mg, Daily      furosemide 20 MG tablet  Commonly known as: LASIX   20 mg, Oral, Daily PRN      losartan-hydrochlorothiazide 100-25 MG per tablet  Commonly known as: HYZAAR   Take 1 tablet by mouth Daily.      Mounjaro 10 MG/0.5ML solution auto-injector  Generic drug: Tirzepatide   10 mg, Subcutaneous, Weekly      multivitamin with minerals tablet tablet   1 tablet, Daily      OSTEO BI-FLEX ADV DOUBLE ST PO   1 tablet, Oral, Daily      Potassium 99 MG tablet   1 tablet, Oral, Daily      pregabalin  Clinical Documentation Reviewer    Query created by: Eleonora Seymour on 4/22/2025 1:28 PM      Electronically signed by:  YURIY RAMOS MD 4/22/2025 1:37 PM           100 MG capsule  Commonly known as: LYRICA   2 capsules, Nightly             Stop These Medications      Chlorhexidine Gluconate 4 % solution     erythromycin 500 MG EC tablet  Commonly known as: LEE-TAB     neomycin 500 MG tablet  Commonly known as: MYCIFRADIN              Allergies   Allergen Reactions    Adhesive Tape Rash     PAPER TAPE IS OK         Discharge Disposition:  Home or Self Care    Diet:  Hospital:No active diet order      Discharge Activity:   Activity Instructions       Other Restrictions (Specify)      No lifting greater than 15 pounds or strenuous activity for 4 weeks.  No driving if taking narcotic pain medication.            Future Appointments   Date Time Provider Department Center   3/6/2025 10:30 AM Nura Smith MD [facility]   8/11/2025  1:00 PM Nura Horn MD [facility]       Additional Instructions for the Follow-ups that You Need to Schedule       Discharge Follow-up with Specified Provider: Luis; 2 Weeks   As directed      To: Luis   Follow Up: 2 Weeks        Notify Physician or Go To The ED For the Following Conditions   As directed      Nausea, vomiting, fever, chills, and/or worsening or unrelenting abdominal pain.    Order Comments: Nausea, vomiting, fever, chills, and/or worsening or unrelenting abdominal pain.                 Time spent on Discharge including face to face service:  15 minutes

## 2025-08-11 ENCOUNTER — OFFICE VISIT (OUTPATIENT)
Dept: CARDIOLOGY | Facility: CLINIC | Age: 70
End: 2025-08-11
Payer: MEDICARE

## 2025-08-11 VITALS
BODY MASS INDEX: 47.66 KG/M2 | HEART RATE: 66 BPM | WEIGHT: 269 LBS | DIASTOLIC BLOOD PRESSURE: 80 MMHG | SYSTOLIC BLOOD PRESSURE: 127 MMHG | HEIGHT: 63 IN

## 2025-08-11 DIAGNOSIS — R94.31 ABNORMAL EKG: Primary | ICD-10-CM

## 2025-08-11 PROCEDURE — 3079F DIAST BP 80-89 MM HG: CPT | Performed by: INTERNAL MEDICINE

## 2025-08-11 PROCEDURE — 3074F SYST BP LT 130 MM HG: CPT | Performed by: INTERNAL MEDICINE

## 2025-08-11 PROCEDURE — 99212 OFFICE O/P EST SF 10 MIN: CPT | Performed by: INTERNAL MEDICINE

## 2025-08-18 ENCOUNTER — OFFICE VISIT (OUTPATIENT)
Dept: DIABETES SERVICES | Facility: HOSPITAL | Age: 70
End: 2025-08-18
Payer: MEDICARE

## 2025-08-18 VITALS
SYSTOLIC BLOOD PRESSURE: 126 MMHG | BODY MASS INDEX: 47.13 KG/M2 | HEART RATE: 71 BPM | WEIGHT: 266 LBS | HEIGHT: 63 IN | DIASTOLIC BLOOD PRESSURE: 69 MMHG

## 2025-08-18 DIAGNOSIS — Z79.4 CONTROLLED TYPE 2 DIABETES MELLITUS WITH DIABETIC POLYNEUROPATHY, WITH LONG-TERM CURRENT USE OF INSULIN: Primary | ICD-10-CM

## 2025-08-18 DIAGNOSIS — E11.42 CONTROLLED TYPE 2 DIABETES MELLITUS WITH DIABETIC POLYNEUROPATHY, WITH LONG-TERM CURRENT USE OF INSULIN: Primary | ICD-10-CM

## 2025-08-18 DIAGNOSIS — E66.01 SEVERE OBESITY (BMI >= 40): ICD-10-CM

## 2025-08-18 LAB
EXPIRATION DATE: NORMAL
GLUCOSE BLDC GLUCOMTR-MCNC: 91 MG/DL (ref 70–99)
HBA1C MFR BLD: 5.3 % (ref 4.5–5.7)
Lab: NORMAL

## 2025-08-18 PROCEDURE — G0463 HOSPITAL OUTPT CLINIC VISIT: HCPCS | Performed by: NURSE PRACTITIONER

## 2025-08-18 PROCEDURE — 3074F SYST BP LT 130 MM HG: CPT | Performed by: NURSE PRACTITIONER

## 2025-08-18 PROCEDURE — 3078F DIAST BP <80 MM HG: CPT | Performed by: NURSE PRACTITIONER

## 2025-08-18 PROCEDURE — 3044F HG A1C LEVEL LT 7.0%: CPT | Performed by: NURSE PRACTITIONER

## 2025-08-18 PROCEDURE — 99214 OFFICE O/P EST MOD 30 MIN: CPT | Performed by: NURSE PRACTITIONER

## 2025-08-18 PROCEDURE — 82948 REAGENT STRIP/BLOOD GLUCOSE: CPT | Performed by: NURSE PRACTITIONER

## 2025-08-18 PROCEDURE — G2211 COMPLEX E/M VISIT ADD ON: HCPCS | Performed by: NURSE PRACTITIONER

## 2025-08-18 PROCEDURE — 83036 HEMOGLOBIN GLYCOSYLATED A1C: CPT | Performed by: NURSE PRACTITIONER

## 2025-08-18 PROCEDURE — 1159F MED LIST DOCD IN RCRD: CPT | Performed by: NURSE PRACTITIONER

## 2025-08-18 PROCEDURE — 1160F RVW MEDS BY RX/DR IN RCRD: CPT | Performed by: NURSE PRACTITIONER

## 2025-08-18 RX ORDER — TIRZEPATIDE 12.5 MG/.5ML
12.5 INJECTION, SOLUTION SUBCUTANEOUS
Qty: 6 ML | Refills: 1 | Status: SHIPPED | OUTPATIENT
Start: 2025-08-18

## 2025-08-28 ENCOUNTER — TELEPHONE (OUTPATIENT)
Dept: DIABETES SERVICES | Facility: HOSPITAL | Age: 70
End: 2025-08-28
Payer: MEDICARE

## (undated) DEVICE — ARM DRAPE

## (undated) DEVICE — TIP COVER ACCESSORY

## (undated) DEVICE — SOL IRR NACL 0.9PCT 1000ML

## (undated) DEVICE — SYR LL TP 10ML STRL

## (undated) DEVICE — SOLIDIFIER LIQLOC PLS 1500CC BT

## (undated) DEVICE — COLON KIT: Brand: MEDLINE INDUSTRIES, INC.

## (undated) DEVICE — STAPLER 60: Brand: SUREFORM

## (undated) DEVICE — Device

## (undated) DEVICE — SOL IRR NACL 0.9PCT BT 1000ML

## (undated) DEVICE — GLOVE,SURG,SENSICARE SLT,LF,PF,7.5: Brand: MEDLINE

## (undated) DEVICE — CONN JET HYDRA H20 AUXILIARY DISP

## (undated) DEVICE — SYR LUERLOK 30CC

## (undated) DEVICE — SINGLE-USE BIOPSY FORCEPS: Brand: RADIAL JAW 4

## (undated) DEVICE — INTENDED FOR TISSUE SEPARATION, AND OTHER PROCEDURES THAT REQUIRE A SHARP SURGICAL BLADE TO PUNCTURE OR CUT.: Brand: BARD-PARKER ® CARBON RIB-BACK BLADES

## (undated) DEVICE — GLOVE,SURG,SENSICARE SLT,LF,PF,6.5: Brand: MEDLINE

## (undated) DEVICE — SLV SCD KN/LEN ADJ EXPRSS BLENDED MD 1P/U

## (undated) DEVICE — TISSUE RETRIEVAL SYSTEM: Brand: INZII RETRIEVAL SYSTEM

## (undated) DEVICE — 1000ML,PRESSURE INFUSER W/STOPCOCK: Brand: MEDLINE

## (undated) DEVICE — LINER SURG CANSTR SXN S/RIGD 1500CC

## (undated) DEVICE — STERILE POLYISOPRENE POWDER-FREE SURGICAL GLOVES WITH EMOLLIENT COATING: Brand: PROTEXIS

## (undated) DEVICE — ENDOPATH PNEUMONEEDLE INSUFFLATION NEEDLES WITH LUER LOCK CONNECTORS 120MM: Brand: ENDOPATH

## (undated) DEVICE — SUT VIC PLS CTD BR 0 TIE 18IN VIL

## (undated) DEVICE — ENDOPATH XCEL WITH OPTIVIEW TECHNOLOGY BLADELESS TROCARS WITH STABILITY SLEEVES: Brand: ENDOPATH XCEL OPTIVIEW

## (undated) DEVICE — SOL IRRG H2O PL/BG 1000ML STRL

## (undated) DEVICE — 1LYRTR 16FR10ML100%SIL UMS SNP: Brand: MEDLINE INDUSTRIES, INC.

## (undated) DEVICE — Device: Brand: ION

## (undated) DEVICE — SOL IRR NACL 0.9PCT BO 1000ML

## (undated) DEVICE — THE STERILE LIGHT HANDLE COVER IS USED WITH STERIS SURGICAL LIGHTING AND VISUALIZATION SYSTEMS.

## (undated) DEVICE — Device: Brand: DEFENDO AIR/WATER/SUCTION AND BIOPSY VALVE

## (undated) DEVICE — PENCL ES MEGADINE EZ/CLEAN BUTN W/HOLSTR 10FT

## (undated) DEVICE — LAPAROVUE VISIBILITY SYSTEM LAPAROSCOPIC SOLUTIONS: Brand: LAPAROVUE

## (undated) DEVICE — SUCTION IRRIGATOR: Brand: ENDOWRIST

## (undated) DEVICE — VESSEL SEALER EXTEND: Brand: ENDOWRIST

## (undated) DEVICE — 3 RING SUTURE PASSER - 16 CM: Brand: 3 RING SUTURE PASSER - 16 CM

## (undated) DEVICE — ANTIBACTERIAL VIOLET BRAIDED (POLYGLACTIN 910), SYNTHETIC ABSORBABLE SUTURE: Brand: COATED VICRYL

## (undated) DEVICE — SEAL

## (undated) DEVICE — SUT PROLN 2/0 PC3 8833H

## (undated) DEVICE — GOWN,SIRUS,POLYRNF,BRTHSLV,2XL,18/CS: Brand: MEDLINE

## (undated) DEVICE — SHEET,DRAPE,70X85,STERILE: Brand: MEDLINE

## (undated) DEVICE — ANTIBACTERIAL UNDYED BRAIDED (POLYGLACTIN 910), SYNTHETIC ABSORBABLE SUTURE: Brand: COATED VICRYL

## (undated) DEVICE — DAVINCI-LF: Brand: MEDLINE INDUSTRIES, INC.

## (undated) DEVICE — EGD OR ERCP KIT: Brand: MEDLINE INDUSTRIES, INC.